# Patient Record
Sex: MALE | Race: WHITE | Employment: FULL TIME | URBAN - NONMETROPOLITAN AREA
[De-identification: names, ages, dates, MRNs, and addresses within clinical notes are randomized per-mention and may not be internally consistent; named-entity substitution may affect disease eponyms.]

---

## 2020-06-19 PROBLEM — I21.3 STEMI (ST ELEVATION MYOCARDIAL INFARCTION) (HCC): Status: ACTIVE | Noted: 2020-06-19

## 2020-06-19 PROCEDURE — 99223 1ST HOSP IP/OBS HIGH 75: CPT | Performed by: INTERNAL MEDICINE

## 2020-06-19 PROCEDURE — C1769 GUIDE WIRE: HCPCS

## 2020-06-19 PROCEDURE — 2709999900 HC NON-CHARGEABLE SUPPLY

## 2020-06-19 PROCEDURE — C1894 INTRO/SHEATH, NON-LASER: HCPCS

## 2020-06-19 PROCEDURE — C1887 CATHETER, GUIDING: HCPCS

## 2020-06-20 ENCOUNTER — HOSPITAL ENCOUNTER (INPATIENT)
Age: 55
LOS: 1 days | Discharge: HOME | DRG: 247 | End: 2020-06-21
Attending: INTERNAL MEDICINE | Admitting: INTERNAL MEDICINE

## 2020-06-20 LAB
ACTIVATED CLOTTING TIME: 246 SECONDS (ref 1–150)
ALBUMIN SERPL-MCNC: 3.9 G/DL (ref 3.5–5.1)
ALP BLD-CCNC: 65 U/L (ref 38–126)
ALT SERPL-CCNC: 53 U/L (ref 11–66)
ANION GAP SERPL CALCULATED.3IONS-SCNC: 10 MEQ/L (ref 8–16)
AST SERPL-CCNC: 217 U/L (ref 5–40)
BILIRUB SERPL-MCNC: 0.7 MG/DL (ref 0.3–1.2)
BILIRUBIN DIRECT: < 0.2 MG/DL (ref 0–0.3)
BUN BLDV-MCNC: 11 MG/DL (ref 7–22)
CALCIUM SERPL-MCNC: 9.1 MG/DL (ref 8.5–10.5)
CHLORIDE BLD-SCNC: 101 MEQ/L (ref 98–111)
CHOLESTEROL, TOTAL: 167 MG/DL (ref 100–199)
CO2: 21 MEQ/L (ref 23–33)
CREAT SERPL-MCNC: 0.7 MG/DL (ref 0.4–1.2)
EKG ATRIAL RATE: 62 BPM
EKG ATRIAL RATE: 65 BPM
EKG P AXIS: 44 DEGREES
EKG P AXIS: 51 DEGREES
EKG P-R INTERVAL: 164 MS
EKG P-R INTERVAL: 168 MS
EKG Q-T INTERVAL: 396 MS
EKG Q-T INTERVAL: 464 MS
EKG QRS DURATION: 84 MS
EKG QRS DURATION: 96 MS
EKG QTC CALCULATION (BAZETT): 401 MS
EKG QTC CALCULATION (BAZETT): 482 MS
EKG R AXIS: 32 DEGREES
EKG R AXIS: 59 DEGREES
EKG T AXIS: -121 DEGREES
EKG T AXIS: -34 DEGREES
EKG VENTRICULAR RATE: 62 BPM
EKG VENTRICULAR RATE: 65 BPM
GFR SERPL CREATININE-BSD FRML MDRD: > 90 ML/MIN/1.73M2
GLUCOSE BLD-MCNC: 186 MG/DL (ref 70–108)
HDLC SERPL-MCNC: 44 MG/DL
LDL CHOLESTEROL CALCULATED: 77 MG/DL
LV EF: 38 %
LVEF MODALITY: NORMAL
POTASSIUM REFLEX MAGNESIUM: 4 MEQ/L (ref 3.5–5.2)
REASON FOR REJECTION: NORMAL
REJECTED TEST: NORMAL
SODIUM BLD-SCNC: 132 MEQ/L (ref 135–145)
TOTAL PROTEIN: 6.3 G/DL (ref 6.1–8)
TRIGL SERPL-MCNC: 230 MG/DL (ref 0–199)
VANCOMYCIN RESISTANT ENTEROCOCCUS: NEGATIVE

## 2020-06-20 PROCEDURE — 85347 COAGULATION TIME ACTIVATED: CPT

## 2020-06-20 PROCEDURE — 2580000003 HC RX 258: Performed by: NURSE PRACTITIONER

## 2020-06-20 PROCEDURE — 6370000000 HC RX 637 (ALT 250 FOR IP)

## 2020-06-20 PROCEDURE — 93306 TTE W/DOPPLER COMPLETE: CPT

## 2020-06-20 PROCEDURE — 92941 PRQ TRLML REVSC TOT OCCL AMI: CPT | Performed by: INTERNAL MEDICINE

## 2020-06-20 PROCEDURE — 93458 L HRT ARTERY/VENTRICLE ANGIO: CPT | Performed by: INTERNAL MEDICINE

## 2020-06-20 PROCEDURE — 2500000003 HC RX 250 WO HCPCS

## 2020-06-20 PROCEDURE — 2709999900 HC NON-CHARGEABLE SUPPLY

## 2020-06-20 PROCEDURE — 6360000004 HC RX CONTRAST MEDICATION: Performed by: INTERNAL MEDICINE

## 2020-06-20 PROCEDURE — 6370000000 HC RX 637 (ALT 250 FOR IP): Performed by: INTERNAL MEDICINE

## 2020-06-20 PROCEDURE — 36415 COLL VENOUS BLD VENIPUNCTURE: CPT

## 2020-06-20 PROCEDURE — 80061 LIPID PANEL: CPT

## 2020-06-20 PROCEDURE — 6370000000 HC RX 637 (ALT 250 FOR IP): Performed by: NURSE PRACTITIONER

## 2020-06-20 PROCEDURE — C1894 INTRO/SHEATH, NON-LASER: HCPCS

## 2020-06-20 PROCEDURE — 93005 ELECTROCARDIOGRAM TRACING: CPT | Performed by: INTERNAL MEDICINE

## 2020-06-20 PROCEDURE — C1769 GUIDE WIRE: HCPCS

## 2020-06-20 PROCEDURE — 6360000002 HC RX W HCPCS

## 2020-06-20 PROCEDURE — 80048 BASIC METABOLIC PNL TOTAL CA: CPT

## 2020-06-20 PROCEDURE — C1887 CATHETER, GUIDING: HCPCS

## 2020-06-20 PROCEDURE — 87081 CULTURE SCREEN ONLY: CPT

## 2020-06-20 PROCEDURE — 4A023N7 MEASUREMENT OF CARDIAC SAMPLING AND PRESSURE, LEFT HEART, PERCUTANEOUS APPROACH: ICD-10-PCS | Performed by: INTERNAL MEDICINE

## 2020-06-20 PROCEDURE — 2140000000 HC CCU INTERMEDIATE R&B

## 2020-06-20 PROCEDURE — C1725 CATH, TRANSLUMIN NON-LASER: HCPCS

## 2020-06-20 PROCEDURE — 027034Z DILATION OF CORONARY ARTERY, ONE ARTERY WITH DRUG-ELUTING INTRALUMINAL DEVICE, PERCUTANEOUS APPROACH: ICD-10-PCS | Performed by: INTERNAL MEDICINE

## 2020-06-20 PROCEDURE — 99232 SBSQ HOSP IP/OBS MODERATE 35: CPT | Performed by: NURSE PRACTITIONER

## 2020-06-20 PROCEDURE — B2111ZZ FLUOROSCOPY OF MULTIPLE CORONARY ARTERIES USING LOW OSMOLAR CONTRAST: ICD-10-PCS | Performed by: INTERNAL MEDICINE

## 2020-06-20 PROCEDURE — 93005 ELECTROCARDIOGRAM TRACING: CPT | Performed by: NURSE PRACTITIONER

## 2020-06-20 PROCEDURE — 94761 N-INVAS EAR/PLS OXIMETRY MLT: CPT

## 2020-06-20 PROCEDURE — B2151ZZ FLUOROSCOPY OF LEFT HEART USING LOW OSMOLAR CONTRAST: ICD-10-PCS | Performed by: INTERNAL MEDICINE

## 2020-06-20 PROCEDURE — 80076 HEPATIC FUNCTION PANEL: CPT

## 2020-06-20 PROCEDURE — 87500 VANOMYCIN DNA AMP PROBE: CPT

## 2020-06-20 PROCEDURE — C1874 STENT, COATED/COV W/DEL SYS: HCPCS

## 2020-06-20 PROCEDURE — 6360000002 HC RX W HCPCS: Performed by: INTERNAL MEDICINE

## 2020-06-20 RX ORDER — ATORVASTATIN CALCIUM 80 MG/1
80 TABLET, FILM COATED ORAL NIGHTLY
Status: DISCONTINUED | OUTPATIENT
Start: 2020-06-20 | End: 2020-06-21 | Stop reason: HOSPADM

## 2020-06-20 RX ORDER — LISINOPRIL 5 MG/1
5 TABLET ORAL DAILY
Status: DISCONTINUED | OUTPATIENT
Start: 2020-06-20 | End: 2020-06-21 | Stop reason: HOSPADM

## 2020-06-20 RX ORDER — SODIUM CHLORIDE 0.9 % (FLUSH) 0.9 %
10 SYRINGE (ML) INJECTION PRN
Status: DISCONTINUED | OUTPATIENT
Start: 2020-06-20 | End: 2020-06-21 | Stop reason: HOSPADM

## 2020-06-20 RX ORDER — ASPIRIN 81 MG/1
81 TABLET, CHEWABLE ORAL DAILY
Status: DISCONTINUED | OUTPATIENT
Start: 2020-06-21 | End: 2020-06-21 | Stop reason: HOSPADM

## 2020-06-20 RX ORDER — ACETAMINOPHEN 325 MG/1
650 TABLET ORAL EVERY 4 HOURS PRN
Status: DISCONTINUED | OUTPATIENT
Start: 2020-06-20 | End: 2020-06-21 | Stop reason: HOSPADM

## 2020-06-20 RX ORDER — SODIUM CHLORIDE 0.9 % (FLUSH) 0.9 %
10 SYRINGE (ML) INJECTION EVERY 12 HOURS SCHEDULED
Status: DISCONTINUED | OUTPATIENT
Start: 2020-06-20 | End: 2020-06-21 | Stop reason: HOSPADM

## 2020-06-20 RX ORDER — ONDANSETRON 2 MG/ML
4 INJECTION INTRAMUSCULAR; INTRAVENOUS EVERY 6 HOURS PRN
Status: DISCONTINUED | OUTPATIENT
Start: 2020-06-20 | End: 2020-06-21 | Stop reason: HOSPADM

## 2020-06-20 RX ORDER — NITROGLYCERIN 20 MG/100ML
20 INJECTION INTRAVENOUS CONTINUOUS
Status: DISCONTINUED | OUTPATIENT
Start: 2020-06-20 | End: 2020-06-20

## 2020-06-20 RX ADMIN — TICAGRELOR 90 MG: 90 TABLET ORAL at 10:25

## 2020-06-20 RX ADMIN — METOPROLOL TARTRATE 25 MG: 25 TABLET ORAL at 08:35

## 2020-06-20 RX ADMIN — ATORVASTATIN CALCIUM 80 MG: 80 TABLET, FILM COATED ORAL at 19:29

## 2020-06-20 RX ADMIN — Medication 10 ML: at 19:30

## 2020-06-20 RX ADMIN — ATORVASTATIN CALCIUM 80 MG: 80 TABLET, FILM COATED ORAL at 01:31

## 2020-06-20 RX ADMIN — METOPROLOL TARTRATE 25 MG: 25 TABLET ORAL at 19:29

## 2020-06-20 RX ADMIN — LISINOPRIL 5 MG: 5 TABLET ORAL at 08:35

## 2020-06-20 RX ADMIN — ACETAMINOPHEN 650 MG: 325 TABLET ORAL at 02:35

## 2020-06-20 RX ADMIN — ONDANSETRON 4 MG: 2 INJECTION INTRAMUSCULAR; INTRAVENOUS at 06:51

## 2020-06-20 RX ADMIN — TICAGRELOR 90 MG: 90 TABLET ORAL at 19:29

## 2020-06-20 RX ADMIN — IOPAMIDOL 150 ML: 755 INJECTION, SOLUTION INTRAVENOUS at 00:52

## 2020-06-20 RX ADMIN — METOPROLOL TARTRATE 25 MG: 25 TABLET ORAL at 01:32

## 2020-06-20 ASSESSMENT — PAIN SCALES - GENERAL
PAINLEVEL_OUTOF10: 5
PAINLEVEL_OUTOF10: 5
PAINLEVEL_OUTOF10: 0
PAINLEVEL_OUTOF10: 1

## 2020-06-20 ASSESSMENT — PAIN DESCRIPTION - DESCRIPTORS
DESCRIPTORS: SORE
DESCRIPTORS: HEADACHE

## 2020-06-20 ASSESSMENT — PAIN DESCRIPTION - ORIENTATION: ORIENTATION: LEFT;MID

## 2020-06-20 ASSESSMENT — PAIN DESCRIPTION - LOCATION
LOCATION: CHEST
LOCATION: HEAD

## 2020-06-20 ASSESSMENT — PAIN DESCRIPTION - PROGRESSION: CLINICAL_PROGRESSION: GRADUALLY IMPROVING

## 2020-06-20 ASSESSMENT — PAIN DESCRIPTION - FREQUENCY
FREQUENCY: INTERMITTENT
FREQUENCY: INTERMITTENT

## 2020-06-20 ASSESSMENT — PAIN DESCRIPTION - PAIN TYPE: TYPE: ACUTE PAIN

## 2020-06-20 NOTE — PROGRESS NOTES
5339 - Patient arrived to unit from Cath lab via bed. Patient transferred to ICU bed and placed on continuous ICU bedside monitor. Patient admitted for STEMI (ST elevation myocardial infarction) ([facility] [address] [I21.3]. Vitals obtained. See flowsheets. Patient's IV access includes 18G in Right AC. Current infusions and rates of infusion include normal saline @ 100 ml/hr and Nitro @ 10 mcg/min. [facility]. Two nurse skin assessment completed by Kristen Ceja. See flowsheets for assessment details. Policies and procedures of ICU able to be explained to patient at this time. Family member(s)/representative(s) present at time of admission include N/A. Patient rights explained to family member(s)/representatives and patient, as able. Patient/patient's family member(s)/representative(s) Declined to have physician notified of their admission. All questions posed by patient's family member(s)/representative(s) and patient answered at this time.

## 2020-06-20 NOTE — H&P
General appearance - alert, in mild distress due to chest pain  Mental status - alert, oriented to person, place, and time  Neck - supple, no significant adenopathy, no JVD, or carotid bruits  Chest - clear to auscultation, no wheezes, rales or rhonchi, symmetric air entry  Heart - normal rate, regular rhythm, normal S1, S2, no murmurs, rubs, clicks or gallops  Abdomen - soft, nontender, nondistended, no masses or organomegaly  Neurological - alert, oriented, normal speech, no focal findings or movement disorder noted  Musculoskeletal - no joint tenderness, deformity or swelling  Extremities - peripheral pulses normal, no pedal edema, no clubbing or cyanosis  Skin - normal coloration and turgor, no rashes, no suspicious skin lesions noted      LABS:    No results for input(s): CKTOTAL, CKMB, CKMBINDEX, TROPONINT in the last 72 hours. CBC: No results found for: WBC, RBC, HGB, HCT, MCV, MCH, MCHC, RDW, PLT, MPV  BMP:  No results found for: NA, K, CL, CO2, BUN, LABALBU, CREATININE, CALCIUM, GFRAA, LABGLOM, GLUCOSE  Hepatic Function Panel:  No results found for: ALKPHOS, ALT, AST, PROT, BILITOT, BILIDIR, IBILI, LABALBU  Magnesium:  No results found for: MG  Warfarin PT/INR:  No components found for: PTPATWAR, PTINRWAR  HgBA1c:  No results found for: LABA1C  FLP:  No results found for: TRIG, HDL, LDLCALC, LDLDIRECT, LABVLDL  TSH:  No results found for: TSH  BNP: No components found for: PRO-BNP      Assessment/Plan:    Patient Active Problem List   Diagnosis    STEMI (ST elevation myocardial infarction) ([facility] [address]     Anterior ST segment elevation MI  HTN  Smoker    IV heparin  Aspirin, P2Y12 inhibitor  Statin, Beta blocker   Needs emergent C  Transfer to ICU/CCU after cath  2D Echo for LVSF/WMA  The indication, risks and benefits of the procedure and possible therapeutic consequences and alternatives were discussed with the patient.    The patient was given the opportunity to ask questions and to have them answered to his/her satisfaction. Risks of the procedure include but are not limited to the following: Bleeding, hematoma including retroperitoneal hematoma, infection, pain and discomfort, injury to the aorta and other blood vessels, rhythm disturbance, low blood pressure, myocardial infarction, stroke, kidney damage/failure, myocardial perforation, allergic reactions to sedatives/contrast material, loss of pulse/vascular compromise requiring surgery, aneurysm/pseudoaneurysm formation, possible loss of a limb/hand/leg, death. Alternatives to and omission of the suggested procedure were discussed. The patient had no further questions and wished to proceed; the consent form was signed. Please do note hesitate to contact me for any further questions.      Code Status: No Order    Electronically signed by Jayme Benjamin MD on 6/19/2020 at 11:00 PM

## 2020-06-20 NOTE — OP NOTE
[facility]  Sedation/Analgesia Post Sedation Record        Pt Name: Brandon Simms  MRN: [id number]  YOB: 1965  Procedure Performed By: Michael Monroe MD, Thu Martinez,   Primary Care Physician: No primary care provider on file.         POST-PROCEDURE    Physicians/Assistants: Michael Monroe MD, Thu Martinez,     Procedure Performed:  Left heart cath and PCI to mid LAD                                  Sedation/Anesthesia:  Local Anesthesia and IV Conscious Sedation with continuous O2 monitoring    Estimated Blood Loss: 10 cc     Specimens Removed:  [x]None []Other:      Disposition of Specimen:  []Pathology []Other        Complications:   [x]None Immediate []Other:       Post Procedure Diagnosis/Findings:    Coronary Artery Disease   STEMI, Anterior         Recommendations:    DAPT   Statin  2D Echo  BB  ICU/CCU Care  Post STEMI/PCI care  IV fluids            Michael Monroe MD, Thu Martinez, [facility]  Electronically signed 6/20/2020 at 12:42 AM

## 2020-06-20 NOTE — PROGRESS NOTES
Cardiology Progress Note      Patient:  Svetlana Owen  YOB: 1965  MRN: [id number]   Acct: [de-identified]  Admit Date:  6/20/2020  Primary Cardiologist: none  Seen by Dr. Zenia Del Rosario    Per prior cardiology consult note-  Reason for Admission:  Chest pain/STEMI        History Of Present Illness:    60 y.o.  male c hx of HTN, diet controlled DM, Smoker who presented to the [address] [place] [address] ER with chest pain. The chest pain started in the morning at 9 am, midsternal, nonradiating, associated with shortness of breath and diaphoresis. The pain was intermittent. EMS initially showed up to his placed but patient did not want to come to the ED. Later in the evening he decided to come to the ED and EKG at [address] [place] [address] showed SR with ST elevation from V1-V6. Has ongoing chest pain.   He is life flighted here    Subjective (Events in last 24 hours):     Pt in bed   States he is feeling much improved from yesterday     Still with slight chest ache as he described it but not the burning that he had yesterday - no other associated symptoms     VSS  Tele SR no ectopy      RT radial cath site - no ecchymosis or hematoma - Pulses present - neurovascular check WNL     Objective:   BP (!) 146/94   Pulse 78   Temp 97.7 °F (36.5 °C) (Oral)   Resp 15   Ht 5' 7\" (1.702 m)   Wt 228 lb 9.9 oz (103.7 kg)   SpO2 97%   BMI 35.81 kg/m²        TELEMETRY: SR    Physical Exam:  General Appearance: alert and oriented to person, place and time, in no acute distress  Cardiovascular: normal rate, regular rhythm, normal S1 and S2, no murmurs, rubs, clicks, or gallops, distal pulses intact,   Pulmonary/Chest: clear to auscultation bilaterally- no wheezes, rales or rhonchi, normal air movement, no respiratory distress  Abdomen: soft, non-tender, non-distended, normal bowel sounds, no masses Extremities: no cyanosis, clubbing or edema, pulses present   Skin: warm and dry, no rash or erythema  Head: normocephalic and atraumatic  Musculoskeletal: normal range of motion, no joint swelling, deformity or tenderness  Neurological: alert, oriented, normal speech, no focal findings or movement disorder noted    Medications:    sodium chloride flush  10 mL Intravenous 2 times per day    metoprolol tartrate  25 mg Oral BID    lisinopril  5 mg Oral Daily    atorvastatin  80 mg Oral Nightly    [START ON 2020] aspirin  81 mg Oral Daily    ticagrelor  90 mg Oral BID      nitroGLYCERIN 25 mcg/min (20 0300)     sodium chloride flush, 10 mL, PRN  acetaminophen, 650 mg, Q4H PRN  magnesium hydroxide, 30 mL, Daily PRN  ondansetron, 4 mg, Q6H PRN        Diagnostics:  EK2020 09:36:57 [facility]-ICU ROUTINE RETRIEVAL  Normal sinus rhythm  ST elevation consider anterolateral injury or acute infarct  Prolonged QT interval or tu fusion, consider myocardial disease, electrolyte imbalance, or drug effects  **  ACUTE MI / STEMI **  Abnormal ECG  When compared with ECG of 2020 03:09,  ST elevation now present in Anterior leads  T wave inversion now evident in Anterolateral leads  QT has lengthened    Echo: pending      Left Heart Cath:   CORONARY ANATOMY:  RIGHT CORONARY ARTERY:  Right coronary artery appears to be the dominant  system. There are mild luminal irregularities in the proximal portion. Mid portion, there is at least 50% stenosis followed by mild luminal  irregularities of the distal portion of the RCA.     LEFT MAIN:  Patent.   Gives rise to LAD and left circumflex arteries.     LEFT CIRCUMFLEX ARTERY:  It is patent in the proximal, mid and distal  portions with mild luminal irregularities distally.     LAD:  Patent and ectatic in the proximal portion, followed by thrombotic  99% stenosis in the mid portion of the LAD, followed by very sluggish  flow in mid to distal portion of the LAD.     There is a large-caliber diagonal branch that comes off just prior to  this thrombotic lesion.     LVEDP was measured to be 22 mmHg. There is no obvious gradient across  the aortic valve. LV systolic function was roughly estimated to be 35%  to 40% with apical anterior wall, anterior apical hypokinesia.       SUMMARY:  1. Successful PCI of thrombotic mid LAD stenosis with Xience Angelika  drug-eluting stent. 2.  LVEDP was measured to be 22 mmHg. 3.  LV systolic function was estimated at 35% to 40% with anterior,  anterior apical wall hypokinesia.     RECOMMENDATIONS:  1. DAPT for at least one year. 2.  Lipid-lowering therapy. 3.  Aggressive risk factor modification. 4.  Transfer to ICU for close monitoring. 5.  2D echocardiogram.  6.  Get cardiac rehab consult.     All procedure details and management plans were discussed in detail with  the patient and family members and they were in agreement with the plan.           Kaylan Wyman MD     D: 06/20/2020         Lab Data:    Cardiac Enzymes:  No results for input(s): CKTOTAL, CKMB, CKMBINDEX, TROPONINI in the last 72 hours. CBC:   No results found for: WBC, RBC, HGB, HCT, PLT    CMP:  No results found for: NA, K, CL, CO2, BUN, CREATININE, GFRAA, AGRATIO, LABGLOM, GLUCOSE, CALCIUM    Hepatic Function Panel:  No results found for: ALKPHOS, ALT, AST, PROT, BILITOT, BILIDIR, IBILI, LABALBU    Magnesium:  No results found for: MG    PT/INR:  No results found for: PROTIME, INR    HgBA1c:  No results found for: LABA1C    FLP:  No results found for: TRIG, HDL, LDLCALC, LDLDIRECT, LABVLDL    TSH:  No results found for: TSH      Assessment:    STEMI - anterior    S\p cardiac cath 6/20/2020: Successful PCI of thrombotic mid LAD stenosis with Xience Angelika drug-eluting stent.     ICDMP EF 35-40% by cath     HTN    Tobacco abuse- 1 pack/ day --- cessation discussed 3 minutes     Hx DM II- off meds         Plan:  · Wean off iv nitro   · Stepdown 3B  · Ambulate   · ekg am   · hgb a1c check   · Follow - possible home tomorrow         Electronically signed by Edu Godwin

## 2020-06-21 VITALS
RESPIRATION RATE: 18 BRPM | HEART RATE: 76 BPM | SYSTOLIC BLOOD PRESSURE: 142 MMHG | OXYGEN SATURATION: 94 % | WEIGHT: 228.62 LBS | DIASTOLIC BLOOD PRESSURE: 95 MMHG | HEIGHT: 67 IN | BODY MASS INDEX: 35.88 KG/M2 | TEMPERATURE: 98.2 F

## 2020-06-21 LAB
ANION GAP SERPL CALCULATED.3IONS-SCNC: 9 MEQ/L (ref 8–16)
BUN BLDV-MCNC: 11 MG/DL (ref 7–22)
CALCIUM SERPL-MCNC: 9.1 MG/DL (ref 8.5–10.5)
CHLORIDE BLD-SCNC: 107 MEQ/L (ref 98–111)
CO2: 23 MEQ/L (ref 23–33)
CREAT SERPL-MCNC: 0.8 MG/DL (ref 0.4–1.2)
EKG ATRIAL RATE: 70 BPM
EKG P AXIS: 49 DEGREES
EKG P-R INTERVAL: 150 MS
EKG Q-T INTERVAL: 464 MS
EKG QRS DURATION: 96 MS
EKG QTC CALCULATION (BAZETT): 501 MS
EKG R AXIS: 59 DEGREES
EKG T AXIS: 178 DEGREES
EKG VENTRICULAR RATE: 70 BPM
ERYTHROCYTE [DISTWIDTH] IN BLOOD BY AUTOMATED COUNT: 14 % (ref 11.5–14.5)
ERYTHROCYTE [DISTWIDTH] IN BLOOD BY AUTOMATED COUNT: 49.8 FL (ref 35–45)
GFR SERPL CREATININE-BSD FRML MDRD: > 90 ML/MIN/1.73M2
GLUCOSE BLD-MCNC: 141 MG/DL (ref 70–108)
HCT VFR BLD CALC: 46.7 % (ref 42–52)
HEMOGLOBIN: 14.5 GM/DL (ref 14–18)
MCH RBC QN AUTO: 29.9 PG (ref 26–33)
MCHC RBC AUTO-ENTMCNC: 31 GM/DL (ref 32.2–35.5)
MCV RBC AUTO: 96.3 FL (ref 80–94)
PLATELET # BLD: 158 THOU/MM3 (ref 130–400)
PMV BLD AUTO: 10.8 FL (ref 9.4–12.4)
POTASSIUM SERPL-SCNC: 4.4 MEQ/L (ref 3.5–5.2)
RBC # BLD: 4.85 MILL/MM3 (ref 4.7–6.1)
SODIUM BLD-SCNC: 139 MEQ/L (ref 135–145)
WBC # BLD: 9.8 THOU/MM3 (ref 4.8–10.8)

## 2020-06-21 PROCEDURE — 93005 ELECTROCARDIOGRAM TRACING: CPT | Performed by: NURSE PRACTITIONER

## 2020-06-21 PROCEDURE — 2580000003 HC RX 258: Performed by: NURSE PRACTITIONER

## 2020-06-21 PROCEDURE — 36415 COLL VENOUS BLD VENIPUNCTURE: CPT

## 2020-06-21 PROCEDURE — 6370000000 HC RX 637 (ALT 250 FOR IP): Performed by: NURSE PRACTITIONER

## 2020-06-21 PROCEDURE — 80048 BASIC METABOLIC PNL TOTAL CA: CPT

## 2020-06-21 PROCEDURE — 99238 HOSP IP/OBS DSCHRG MGMT 30/<: CPT | Performed by: NURSE PRACTITIONER

## 2020-06-21 PROCEDURE — 85027 COMPLETE CBC AUTOMATED: CPT

## 2020-06-21 RX ORDER — LISINOPRIL 5 MG/1
5 TABLET ORAL DAILY
Qty: 30 TABLET | Refills: 3 | Status: SHIPPED | OUTPATIENT
Start: 2020-06-22 | End: 2020-10-19 | Stop reason: SDUPTHER

## 2020-06-21 RX ORDER — ATORVASTATIN CALCIUM 80 MG/1
80 TABLET, FILM COATED ORAL NIGHTLY
Qty: 30 TABLET | Refills: 3 | Status: SHIPPED | OUTPATIENT
Start: 2020-06-21 | End: 2021-08-19 | Stop reason: SDUPTHER

## 2020-06-21 RX ORDER — NITROGLYCERIN 0.4 MG/1
0.4 TABLET SUBLINGUAL EVERY 5 MIN PRN
Qty: 25 TABLET | Refills: 1 | Status: SHIPPED | OUTPATIENT
Start: 2020-06-21 | End: 2020-11-02 | Stop reason: SDUPTHER

## 2020-06-21 RX ORDER — ASPIRIN 81 MG/1
81 TABLET, CHEWABLE ORAL DAILY
Qty: 30 TABLET | Refills: 3 | Status: SHIPPED | OUTPATIENT
Start: 2020-06-22

## 2020-06-21 RX ADMIN — LISINOPRIL 5 MG: 5 TABLET ORAL at 09:42

## 2020-06-21 RX ADMIN — ASPIRIN 81 MG 81 MG: 81 TABLET ORAL at 09:42

## 2020-06-21 RX ADMIN — TICAGRELOR 90 MG: 90 TABLET ORAL at 09:42

## 2020-06-21 RX ADMIN — METOPROLOL TARTRATE 25 MG: 25 TABLET ORAL at 09:41

## 2020-06-21 RX ADMIN — Medication 10 ML: at 09:42

## 2020-06-21 ASSESSMENT — PAIN SCALES - GENERAL: PAINLEVEL_OUTOF10: 0

## 2020-06-21 NOTE — PROCEDURES
EKG was handed to Lila López.
Ekg was given to [place]
portion of the LAD, followed by very sluggish  flow in mid to distal portion of the LAD. There is a large-caliber diagonal branch that comes off just prior to  this thrombotic lesion. LVEDP was measured to be 22 mmHg. There is no obvious gradient across  the aortic valve. LV systolic function was roughly estimated to be 35%  to 40% with apical anterior wall, anterior apical hypokinesia. IV heparin was given and ACT was maintained above 250 seconds. EBU 3.5  guide catheter was used to engage the left main. A Runthrough wire was  used to cross the critical stenosis in the LAD and the tip of the wire  was placed in the distal LAD. We first predilated the lesion with 2.5 x  12 mm Trek balloon. After this, we placed a 3.25 x 28 mm Xience Angelika  drug-eluting stent from normal-to-normal segment fashion. This stent  was post dilated at high pressures to up to 4.0 mm in diameter to ensure  good vessel wall apposition. After this, repeated angiogram showed good  LASHONDA-3 flow throughout the entire main vessel as well as the side  branches. There were no dissections, perforations, distal  embolizations, or side branch closures that were noted. The patient's chest pain at this point resolved, although there were  still EKG changes. He tolerated the procedure well. He was given 180 mg of loading dose of  Brilinta on the table. SUMMARY:  1. Successful PCI of thrombotic mid LAD stenosis with Xience Angelika  drug-eluting stent. 2.  LVEDP was measured to be 22 mmHg. 3.  LV systolic function was estimated at 35% to 40% with anterior,  anterior apical wall hypokinesia. RECOMMENDATIONS:  1. DAPT for at least one year. 2.  Lipid-lowering therapy. 3.  Aggressive risk factor modification. 4.  Transfer to ICU for close monitoring. 5.  2D echocardiogram.  6.  Get cardiac rehab consult.     All procedure details and management plans were discussed in detail with  the patient and family members and they

## 2020-06-21 NOTE — PROGRESS NOTES
Extremities: no cyanosis, clubbing or edema, pulses present   Skin: warm and dry, no rash or erythema  Head: normocephalic and atraumatic  Musculoskeletal: normal range of motion, no joint swelling, deformity or tenderness  Neurological: alert, oriented, normal speech, no focal findings or movement disorder noted    Medications:    sodium chloride flush  10 mL Intravenous 2 times per day    metoprolol tartrate  25 mg Oral BID    lisinopril  5 mg Oral Daily    atorvastatin  80 mg Oral Nightly    aspirin  81 mg Oral Daily    ticagrelor  90 mg Oral BID       sodium chloride flush, 10 mL, PRN  acetaminophen, 650 mg, Q4H PRN  magnesium hydroxide, 30 mL, Daily PRN  ondansetron, 4 mg, Q6H PRN        Diagnostics:  EK2020 09:36:57 [facility]-ICU ROUTINE RETRIEVAL  Normal sinus rhythm  ST elevation consider anterolateral injury or acute infarct  Prolonged QT interval or tu fusion, consider myocardial disease, electrolyte imbalance, or drug effects  **  ACUTE MI / STEMI **  Abnormal ECG  When compared with ECG of 2020 03:09,  ST elevation now present in Anterior leads  T wave inversion now evident in Anterolateral leads  QT has lengthened    Echo: pending      Left Heart Cath:   CORONARY ANATOMY:  RIGHT CORONARY ARTERY:  Right coronary artery appears to be the dominant  system. There are mild luminal irregularities in the proximal portion. Mid portion, there is at least 50% stenosis followed by mild luminal  irregularities of the distal portion of the RCA.     LEFT MAIN:  Patent.   Gives rise to LAD and left circumflex arteries.     LEFT CIRCUMFLEX ARTERY:  It is patent in the proximal, mid and distal  portions with mild luminal irregularities distally.     LAD:  Patent and ectatic in the proximal portion, followed by thrombotic  99% stenosis in the mid portion of the LAD, followed by very sluggish  flow in mid to distal portion of the LAD.     There is a large-caliber diagonal branch that comes off just prior to  this thrombotic lesion.     LVEDP was measured to be 22 mmHg. There is no obvious gradient across  the aortic valve. LV systolic function was roughly estimated to be 35%  to 40% with apical anterior wall, anterior apical hypokinesia.       SUMMARY:  1. Successful PCI of thrombotic mid LAD stenosis with Xience Angelika  drug-eluting stent. 2.  LVEDP was measured to be 22 mmHg. 3.  LV systolic function was estimated at 35% to 40% with anterior,  anterior apical wall hypokinesia.     RECOMMENDATIONS:  1. DAPT for at least one year. 2.  Lipid-lowering therapy. 3.  Aggressive risk factor modification. 4.  Transfer to ICU for close monitoring. 5.  2D echocardiogram.  6.  Get cardiac rehab consult.     All procedure details and management plans were discussed in detail with  the patient and family members and they were in agreement with the plan.           Jolynn Tellez MD     D: 06/20/2020         Lab Data:    Cardiac Enzymes:  No results for input(s): CKTOTAL, CKMB, CKMBINDEX, TROPONINI in the last 72 hours.     CBC:   Lab Results   Component Value Date    WBC 9.8 06/21/2020    RBC 4.85 06/21/2020    HGB 14.5 06/21/2020    HCT 46.7 06/21/2020     06/21/2020       CMP:    Lab Results   Component Value Date     06/21/2020    K 4.4 06/21/2020    K 4.0 06/20/2020     06/21/2020    CO2 23 06/21/2020    BUN 11 06/21/2020    CREATININE 0.8 06/21/2020    LABGLOM >90 06/21/2020    GLUCOSE 141 06/21/2020    CALCIUM 9.1 06/21/2020       Hepatic Function Panel:    Lab Results   Component Value Date    ALKPHOS 65 06/20/2020    ALT 53 06/20/2020     06/20/2020    PROT 6.3 06/20/2020    BILITOT 0.7 06/20/2020    BILIDIR <0.2 06/20/2020    LABALBU 3.9 06/20/2020       Magnesium:  No results found for: MG    PT/INR:  No results found for: PROTIME, INR    HgBA1c:  No results found for: LABA1C    FLP:    Lab Results   Component Value Date    TRIG 230 06/20/2020    HDL 44 06/20/2020

## 2020-06-22 ENCOUNTER — TELEPHONE (OUTPATIENT)
Dept: CARDIOLOGY CLINIC | Age: 55
End: 2020-06-22

## 2020-06-22 LAB — MRSA SCREEN: NORMAL

## 2020-06-22 NOTE — TELEPHONE ENCOUNTER
[place] for pt to return call. appt scheduled 6/29/20 at 1215PM with Dr. Kristan Castillo.   Does this date/time work for pt?

## 2020-06-22 NOTE — PROGRESS NOTES
Inpatient Cardiac Rehabilitation Consult    Received consult for Phase II Cardiac Rehabilitation. Missed pt over the weekend. Will call him at home to complete cardiac rehab education and schedule.

## 2020-07-23 ENCOUNTER — OFFICE VISIT (OUTPATIENT)
Dept: CARDIOLOGY CLINIC | Age: 55
End: 2020-07-23

## 2020-07-23 VITALS
HEART RATE: 84 BPM | DIASTOLIC BLOOD PRESSURE: 84 MMHG | SYSTOLIC BLOOD PRESSURE: 140 MMHG | WEIGHT: 229 LBS | BODY MASS INDEX: 36.8 KG/M2 | HEIGHT: 66 IN

## 2020-07-23 PROCEDURE — 99213 OFFICE O/P EST LOW 20 MIN: CPT | Performed by: NURSE PRACTITIONER

## 2020-07-23 RX ORDER — CLOPIDOGREL BISULFATE 75 MG/1
75 TABLET ORAL DAILY
Qty: 90 TABLET | Refills: 3 | Status: SHIPPED | OUTPATIENT
Start: 2020-07-23 | End: 2021-07-28 | Stop reason: SDUPTHER

## 2020-07-23 RX ORDER — ISOSORBIDE MONONITRATE 30 MG/1
30 TABLET, EXTENDED RELEASE ORAL DAILY
Qty: 90 TABLET | Refills: 3 | Status: SHIPPED | OUTPATIENT
Start: 2020-07-23 | End: 2021-08-02 | Stop reason: SDUPTHER

## 2020-07-23 RX ORDER — CLOPIDOGREL 300 MG/1
600 TABLET, FILM COATED ORAL ONCE
Qty: 2 TABLET | Refills: 0 | Status: SHIPPED | OUTPATIENT
Start: 2020-07-23 | End: 2021-02-08

## 2020-07-23 NOTE — PROGRESS NOTES
[facility] PROFESSIONAL SERVICES  [facility]   [address]   [address] [address]   Dept: [phone]   Dept Fax: [phone]   Loc: [phone]      Chief Complaint   Patient presents with    Follow-up     F/U STEMI with LHC/PCI/TANMAY of thrombotic mid LAD, ICMP EF 35-40 in 59 y/o male with history of currently smoking 1/2 pack cigarettes along with 4-5 pouches smokeless tobacco. Denies chest pain, palpitations, sob, PHIL, lightheadedness, dizziness or syncope. No bleeding issues with DAPT. Left radial cath site soft without pain, bleeding, drainage, redness, or warmth. Right upper extremity with normal color / temperature, evident movement / sensation, and pulses present. Cardiologist:  Dr. Weaver Postal:   No fever, no chills, No fatigue or weight loss  Pulmonary:    No dyspnea, no wheezing  Cardiac:    Denies recent chest pain   GI:     No nausea or vomiting, no abdominal pain  Neuro:    No dizziness or light headedness  Musculoskeletal:  No recent active issues  Extremities:   No edema, good peripheral pulses      History reviewed. No pertinent past medical history.     Allergies   Allergen Reactions    Shellfish-Derived Products        Current Outpatient Medications   Medication Sig Dispense Refill    clopidogrel (PLAVIX) 300 MG TABS Take 2 tablets by mouth once for 1 dose 2 tablet 0    clopidogrel (PLAVIX) 75 MG tablet Take 1 tablet by mouth daily 90 tablet 3    isosorbide mononitrate (IMDUR) 30 MG extended release tablet Take 1 tablet by mouth daily 90 tablet 3    aspirin 81 MG chewable tablet Take 1 tablet by mouth daily 30 tablet 3    atorvastatin (LIPITOR) 80 MG tablet Take 1 tablet by mouth nightly 30 tablet 3    lisinopril (PRINIVIL;ZESTRIL) 5 MG tablet Take 1 tablet by mouth daily 30 tablet 3    metoprolol tartrate (LOPRESSOR) 25 MG tablet Take 1 tablet by mouth 2 times daily 60 tablet 3    nitroGLYCERIN (NITROSTAT) 0.4 MG SL tablet Place 1 tablet under the tongue every 5 minutes as needed for Chest pain up to max of 3 total doses. If no relief after 1 dose, call 911. 89 tablet 1     No current facility-administered medications for this visit. Social History     Socioeconomic History    Marital status: Single     Spouse name: None    Number of children: None    Years of education: None    Highest education level: None   Occupational History    None   Social Needs    Financial resource strain: None    Food insecurity     Worry: None     Inability: None    Transportation needs     Medical: None     Non-medical: None   Tobacco Use    Smoking status: Current Every Day Smoker    Smokeless tobacco: Never Used   Substance and Sexual Activity    Alcohol use: None    Drug use: None    Sexual activity: None   Lifestyle    Physical activity     Days per week: None     Minutes per session: None    Stress: None   Relationships    Social connections     Talks on phone: None     Gets together: None     Attends Sikhism service: None     Active member of club or organization: None     Attends meetings of clubs or organizations: None     Relationship status: None    Intimate partner violence     Fear of current or ex partner: None     Emotionally abused: None     Physically abused: None     Forced sexual activity: None   Other Topics Concern    None   Social History Narrative    None       History reviewed. No pertinent family history. Blood pressure (!) 140/84, pulse 84, height 5' 6\" (1.676 m), weight 229 lb (103.9 kg). General:   Well developed, well nourished  Lungs:   Clear to auscultation  Heart:    Normal S1 S2, No murmur, rubs, or gallops  Abdomen:   Soft, non tender, no organomegalies, positive bowel sounds  Extremities:   No edema, no cyanosis, good peripheral pulses  Neurological:   Awake, alert, oriented.  No obvious focal deficits  Musculoskeletal:  No obvious deformities    EK20  Normal sinus rhythm  ST & Marked T wave abnormality, consider anterolateral ischemia  Prolonged QT interval or tu fusion, consider myocardial disease, electrolyte imbalance, or drug effects  Abnormal ECG  When compared with ECG of 20-JUN-2020 09:36,  T wave inversion more evident in Lateral leads  Confirmed by Melanie Frizzle   Echo: 6/20/20  Summary   Left Ventricular size is Mildly increased . Normal left ventricular wall thickness. There were marked regional wall motion abnormalities. Akinetic wall motion of the DISTAL AND MID SEGMENT OF THE SEPTAL AND   anteroseptal wallS,AS WELL AS THE APEX OF the left ventricle. Severly hypokinetic motion of the anterior AND DISTAL inferior walls of   the left ventricle. Systolic function was moderately reduced. Ejection fraction is visually estimated in the range of 35% to 40%. Doppler parameters were consistent with abnormal left ventricular   relaxation (grade 1 diastolic dysfunction). Signature      ----------------------------------------------------------------   Electronically signed by Keke Sosa MD     PROCEDURE PERFORMED:  Left heart catheterization, LV gram, PCI of mid  LAD.     INDICATION FOR STUDY:  ST-segment elevation MI.   CORONARY ANATOMY:  RIGHT CORONARY ARTERY:  Right coronary artery appears to be the dominant  system. There are mild luminal irregularities in the proximal portion. Mid portion, there is at least 50% stenosis followed by mild luminal  irregularities of the distal portion of the RCA.     LEFT MAIN:  Patent.   Gives rise to LAD and left circumflex arteries.     LEFT CIRCUMFLEX ARTERY:  It is patent in the proximal, mid and distal  portions with mild luminal irregularities distally.     LAD:  Patent and ectatic in the proximal portion, followed by thrombotic  99% stenosis in the mid portion of the LAD, followed by very sluggish  flow in mid to distal portion of the LAD.     There is a large-caliber diagonal branch that comes off just prior to  this thrombotic lesion.     LVEDP was measured to be 22 mmHg. There is no obvious gradient across  the aortic valve. LV systolic function was roughly estimated to be 35%  to 40% with apical anterior wall, anterior apical hypokinesia. SUMMARY:  1. Successful PCI of thrombotic mid LAD stenosis with Xience Angelika  drug-eluting stent. 2.  LVEDP was measured to be 22 mmHg. 3.  LV systolic function was estimated at 35% to 40% with anterior,  anterior apical wall hypokinesia.     RECOMMENDATIONS:  1. DAPT for at least one year. 2.  Lipid-lowering therapy. 3.  Aggressive risk factor modification. 4.  Transfer to ICU for close monitoring. 5.  2D echocardiogram.  6.  Get cardiac rehab consult.     All procedure details and management plans were discussed in detail with  the patient and family members and they were in agreement with the plan.           Anna Johnson MD     Diagnosis Orders   1. Hyperlipidemia, unspecified hyperlipidemia type  Lipid Panel    Hepatic Function Panel   2. S/P percutaneous transluminal angioplasty (PTA) with stent placement     3. ST elevation myocardial infarction involving left anterior descending (LAD) coronary artery ([address]     4. Ischemic cardiomyopathy  Echo limited   5. Tobacco abuse         Orders Placed This Encounter   Procedures    Lipid Panel     Standing Status:   Future     Standing Expiration Date:   7/23/2021     Order Specific Question:   Is Patient Fasting?/# of Hours     Answer:   15    Hepatic Function Panel     Standing Status:   Future     Standing Expiration Date:   7/23/2021    Echo limited     Standing Status:   Future     Standing Expiration Date:   7/23/2021     Order Specific Question:   Reason for exam:     Answer:   STEMI, s/p PCI   Successful PCI of thrombotic mid LAD stenosis with Xience Angelika  drug-eluting stent. ICMP EF 35-40, F7OS-lc fluid overload    On asa, lipitor 80 mg, lisinopril, lopressor, brilinta, NTG SL prn  Cannot afford brilinta.  Will load with plavix 600 mg today and then starting tomorrow 75 mg daily  Add imdur 30 mg daily  Refuses cardiac rehab at this time d/t work schedule. Repeat lipid and hepatic panel in 3 months since lipitor started. Repeat limited echo in 3 months to re-evaluate LV function.     Discussed use, benefit, and side effects of prescribed medications. All patient questions answered. Pt voiced understanding. Instructed to continue current medications, diet and exercise. Continue risk factor modification and medical management. Patient agreed with treatment plan. Follow up as directed. Discussed and encouraged smoking cessation to decrease adverse associated health risks including but not limited to heart disease, hypertension, peripheral vascular disease, lung disease, heart attack, stroke, cancer, loss of limb or death.     Continue Dr Flora Nielson current treatment plan  Follow up with Dr Lacy Napier as scheduled or sooner if needed

## 2020-07-24 ENCOUNTER — TELEPHONE (OUTPATIENT)
Dept: CARDIOLOGY CLINIC | Age: 55
End: 2020-07-24

## 2020-10-19 RX ORDER — LISINOPRIL 5 MG/1
5 TABLET ORAL DAILY
Qty: 30 TABLET | Refills: 0 | Status: SHIPPED | OUTPATIENT
Start: 2020-10-19 | End: 2020-11-20 | Stop reason: SDUPTHER

## 2020-10-19 NOTE — TELEPHONE ENCOUNTER
Diamantina Goldmann called requesting a refill on the following medications:  Requested Prescriptions     Pending Prescriptions Disp Refills    metoprolol tartrate (LOPRESSOR) 25 MG tablet 60 tablet 3     Sig: Take 1 tablet by mouth 2 times daily    lisinopril (PRINIVIL;ZESTRIL) 5 MG tablet 30 tablet 3     Sig: Take 1 tablet by mouth daily     Pharmacy verified: [facility]  . pv      Date of last visit: 7/23/2020  Date of next visit (if applicable): 25/97/3379

## 2020-10-27 ENCOUNTER — TELEPHONE (OUTPATIENT)
Dept: CARDIOLOGY CLINIC | Age: 55
End: 2020-10-27

## 2020-10-27 NOTE — TELEPHONE ENCOUNTER
Patient called to cancel his appointment that was Thursday 10/29/2020 because he has not been able to get his labs and echo done yet due to his work schedule and he was assuming that he needed to have those done prior to his appointment. Patient rescheduled appointment to the first available opening 12/24/2020.   DOLV 07/23/2020  Please advise

## 2020-11-02 RX ORDER — NITROGLYCERIN 0.4 MG/1
0.4 TABLET SUBLINGUAL EVERY 5 MIN PRN
Qty: 25 TABLET | Refills: 0 | Status: SHIPPED | OUTPATIENT
Start: 2020-11-02 | End: 2021-02-16 | Stop reason: SDUPTHER

## 2020-11-20 NOTE — TELEPHONE ENCOUNTER
Eber Stone called requesting a refill on the following medications:  Requested Prescriptions     Pending Prescriptions Disp Refills    lisinopril (PRINIVIL;ZESTRIL) 5 MG tablet 30 tablet 0     Sig: Take 1 tablet by mouth daily     Pharmacy verified:  [facility] [place], [place]    Date of last visit: 07/23/2020  Date of next visit (if applicable): 51/05/5800

## 2020-11-23 RX ORDER — LISINOPRIL 5 MG/1
5 TABLET ORAL DAILY
Qty: 30 TABLET | Refills: 1 | Status: SHIPPED | OUTPATIENT
Start: 2020-11-23 | End: 2021-02-01

## 2021-02-01 RX ORDER — LISINOPRIL 5 MG/1
TABLET ORAL
Qty: 30 TABLET | Refills: 0 | Status: SHIPPED | OUTPATIENT
Start: 2021-02-01 | End: 2021-02-08

## 2021-02-08 ENCOUNTER — OFFICE VISIT (OUTPATIENT)
Dept: CARDIOLOGY CLINIC | Age: 56
End: 2021-02-08

## 2021-02-08 VITALS
WEIGHT: 242 LBS | DIASTOLIC BLOOD PRESSURE: 88 MMHG | SYSTOLIC BLOOD PRESSURE: 168 MMHG | HEART RATE: 88 BPM | HEIGHT: 66 IN | BODY MASS INDEX: 38.89 KG/M2

## 2021-02-08 DIAGNOSIS — E78.5 HYPERLIPIDEMIA, UNSPECIFIED HYPERLIPIDEMIA TYPE: ICD-10-CM

## 2021-02-08 DIAGNOSIS — I10 ESSENTIAL HYPERTENSION: ICD-10-CM

## 2021-02-08 DIAGNOSIS — I25.10 CORONARY ARTERY DISEASE DUE TO LIPID RICH PLAQUE: Primary | ICD-10-CM

## 2021-02-08 DIAGNOSIS — M79.606 PAIN OF LOWER EXTREMITY, UNSPECIFIED LATERALITY: ICD-10-CM

## 2021-02-08 DIAGNOSIS — I25.83 CORONARY ARTERY DISEASE DUE TO LIPID RICH PLAQUE: Primary | ICD-10-CM

## 2021-02-08 DIAGNOSIS — R06.02 SHORTNESS OF BREATH: ICD-10-CM

## 2021-02-08 PROCEDURE — 99214 OFFICE O/P EST MOD 30 MIN: CPT | Performed by: INTERNAL MEDICINE

## 2021-02-08 RX ORDER — METOPROLOL SUCCINATE 50 MG/1
50 TABLET, EXTENDED RELEASE ORAL DAILY
Qty: 30 TABLET | Refills: 3 | Status: SHIPPED | OUTPATIENT
Start: 2021-02-08 | End: 2021-06-17

## 2021-02-08 RX ORDER — LISINOPRIL 10 MG/1
10 TABLET ORAL DAILY
Qty: 30 TABLET | Refills: 3 | Status: SHIPPED | OUTPATIENT
Start: 2021-02-08 | End: 2021-06-18 | Stop reason: SDUPTHER

## 2021-02-08 NOTE — PROGRESS NOTES
[address] [facility] [address] [facility] [address]  Dept: [phone]  Dept Fax: [phone]  Loc: [phone]    Visit Date: 2/8/2021    Mr. Petey Ramirez is a 60 y.o. male  who presented for:  Chief Complaint   Patient presents with    3 Month Follow-Up       HPI:   59 yo M c hx of STEMI s/p PCI of LAD 6/20/20 is here for a follow up. He is on DAPT, compliant. Denies any chest pain, sob, palpitations, lightheadedness, dizziness, orthopnea, PND or pedal edema. Current Outpatient Medications:     lisinopril (PRINIVIL;ZESTRIL) 10 MG tablet, Take 1 tablet by mouth daily, Disp: 30 tablet, Rfl: 3    metoprolol succinate (TOPROL XL) 50 MG extended release tablet, Take 1 tablet by mouth daily, Disp: 30 tablet, Rfl: 3    nitroGLYCERIN (NITROSTAT) 0.4 MG SL tablet, Place 1 tablet under the tongue every 5 minutes as needed for Chest pain up to max of 3 total doses. If no relief after 1 dose, call 911., Disp: 25 tablet, Rfl: 0    clopidogrel (PLAVIX) 75 MG tablet, Take 1 tablet by mouth daily, Disp: 90 tablet, Rfl: 3    isosorbide mononitrate (IMDUR) 30 MG extended release tablet, Take 1 tablet by mouth daily, Disp: 90 tablet, Rfl: 3    aspirin 81 MG chewable tablet, Take 1 tablet by mouth daily, Disp: 30 tablet, Rfl: 3    atorvastatin (LIPITOR) 80 MG tablet, Take 1 tablet by mouth nightly, Disp: 30 tablet, Rfl: 3    Past Medical History  Jeremias Wallace  has no past medical history on file. Social History  Jeremias Wallace  reports that he has been smoking. He has never used smokeless tobacco.    Family History  Jeremias Wallace family history is not on file. Past Surgical History   History reviewed. No pertinent surgical history. Subjective:     REVIEW OF SYSTEMS  Constitutional: denies sweats, chills and fever  HENT: denies  congestion, sinus pressure, sneezing and sore throat. Eyes: denies  pain, discharge, redness and itching.    Respiratory: denies apnea, cough  Gastrointestinal: denies blood in stool, constipation, diarrhea   Endocrine: denies cold intolerance, heat intolerance, polydipsia. Genitourinary: denies dysuria, enuresis, flank pain and hematuria. Musculoskeletal: denies arthralgias, joint swelling and neck pain. Neurological: denies numbness and headaches. Psychiatric/Behavioral: denies agitation, confusion, decreased concentration and dysphoric mood    All others reviewed and are negative. Objective:     BP (!) 168/88   Pulse 88   Ht 5' 6\" (1.676 m)   Wt 242 lb (109.8 kg)   BMI 39.06 kg/m²     Wt Readings from Last 3 Encounters:   02/08/21 242 lb (109.8 kg)   07/23/20 229 lb (103.9 kg)   06/20/20 228 lb 9.9 oz (103.7 kg)     BP Readings from Last 3 Encounters:   02/08/21 (!) 168/88   07/23/20 (!) 140/84   06/21/20 (!) 142/95       PHYSICAL EXAM  Constitutional: Oriented to person, place, and time. Appears well-developed and well-nourished. HENT:   Head: Normocephalic and atraumatic. Eyes: EOM are normal. Pupils are equal, round, and reactive to light. Neck: Normal range of motion. Neck supple. No JVD present. Cardiovascular: Normal rate , normal heart sounds and intact distal pulses. Pulmonary/Chest: Effort normal and breath sounds normal. No respiratory distress. No wheezes. No rales. Abdominal: Soft. Bowel sounds are normal. No distension. There is no tenderness. Musculoskeletal: Normal range of motion. No edema. Neurological: Alert and oriented to person, place, and time. No cranial nerve deficit. Coordination normal.   Skin: Skin is warm and dry. Psychiatric: Normal mood and affect.        No results found for: CKTOTAL, CKMB, CKMBINDEX    Lab Results   Component Value Date    WBC 9.8 06/21/2020    RBC 4.85 06/21/2020    HGB 14.5 06/21/2020    HCT 46.7 06/21/2020    MCV 96.3 06/21/2020    MCH 29.9 06/21/2020    MCHC 31.0 06/21/2020     06/21/2020    MPV 10.8 06/21/2020       Lab Results   Component Value Date     06/21/2020    K 4.4 06/21/2020    K 4.0 06/20/2020     06/21/2020    CO2 23 06/21/2020    BUN 11 06/21/2020    LABALBU 3.9 06/20/2020    CREATININE 0.8 06/21/2020    CALCIUM 9.1 06/21/2020    LABGLOM >90 06/21/2020    GLUCOSE 141 06/21/2020       Lab Results   Component Value Date    ALKPHOS 65 06/20/2020    ALT 53 06/20/2020     06/20/2020    PROT 6.3 06/20/2020    BILITOT 0.7 06/20/2020    BILIDIR <0.2 06/20/2020    LABALBU 3.9 06/20/2020       No results found for: MG    No results found for: INR, PROTIME      No results found for: LABA1C    Lab Results   Component Value Date    TRIG 230 06/20/2020    HDL 44 06/20/2020    LDLCALC 77 06/20/2020       No results found for: TSH      Testing Reviewed:      I haveindividually reviewed the below cardiac tests    EKG:    ECHO:   Results for orders placed during the hospital encounter of 06/20/20   ECHO Complete 2D W Doppler W Color    Narrative Transthoracic Echocardiography Report (TTE)     Demographics      Patient Name    Julian Forward Gender                Male      MR #            [id number]       Race                                                        Ethnicity      Account #       [de-identified]       Room Number           3234      Accession       [id number]      Date of Study         06/20/2020   Number      Date of Birth   1965      Referring Physician   Monica Carrillo MD      Age             60 year(s)      Eduardo Cobb, [facility] Larissa Friedman MD                                   Physician     Procedure    Type of Study      TTE procedure:ECHOCARDIOGRAM COMPLETE 2D W DOPPLER W COLOR. Procedure Date  Date: 06/20/2020 Start: 08:11 AM    Study Location: Bedside  Technical Quality: Limited visualization due to restricted mobility. Indications:STEMI.     Additional Medical History:STEMI, Chest pain    Patient Status: Routine    Height: 67 inches Weight: 230 pounds BSA: 2.15 m^2 BMI: 36.02 kg/m^2    BP: 139/107 mmHg    Allergies    - See Epic. Conclusions      Summary   Left Ventricular size is Mildly increased . Normal left ventricular wall thickness. There were marked regional wall motion abnormalities. Akinetic wall motion of the DISTAL AND MID SEGMENT OF THE SEPTAL AND   anteroseptal wallS,AS WELL AS THE APEX OF the left ventricle. Severly hypokinetic motion of the anterior AND DISTAL inferior walls of   the left ventricle. Systolic function was moderately reduced. Ejection fraction is visually estimated in the range of 35% to 40%. Doppler parameters were consistent with abnormal left ventricular   relaxation (grade 1 diastolic dysfunction). Signature      ----------------------------------------------------------------   Electronically signed by Michaelle Valerio MD (Interpreting   physician) on 06/20/2020 at 04:28 PM   ----------------------------------------------------------------      Findings      Mitral Valve   The mitral valve structure was normal with normal leaflet separation. DOPPLER: The transmitral velocity was within the normal range with no   evidence for mitral stenosis. Mild mitral regurgitation is present. Aortic Valve   The aortic valve was trileaflet with normal thickness and cuspal   separation. DOPPLER: Transaortic velocity was within the normal range with   no evidence of aortic stenosis. There was no evidence of aortic   regurgitation. Tricuspid Valve   The tricuspid valve structure was normal with normal leaflet separation. DOPPLER: There was no evidence of tricuspid stenosis. There was no   evidence of tricuspid regurgitation. Pulmonic Valve   The pulmonic valve leaflets exhibited normal thickness, no calcification,   and normal cuspal separation. DOPPLER: The transpulmonic velocity was   within the normal range with no evidence for regurgitation.       Left Atrium   Left atrial size was normal.      Left Ventricle   Left Ventricular size is Mildly increased . Normal left ventricular wall thickness. Akinetic motion of the DISTAL AND MID SEGMENT OF THE SEPTAL AND   anteroseptal wallS,AS WELL AS THE APEX OF the left ventricle. Severly hypokinetic motion of the anterior AND DISTAL inferior walls of   the left ventricle. Systolic function was moderately reduced. Ejection fraction is visually estimated in the range of 35% to 40%. Doppler parameters were consistent with abnormal left ventricular   relaxation (grade 1 diastolic dysfunction). There were marked regional wall motion abnormalities. Right Atrium   Right atrial size was normal.      Right Ventricle   The right ventricular size was normal with normal systolic function and   wall thickness. Pericardial Effusion   The pericardium was normal in appearance with no evidence of a pericardial   effusion. Pleural Effusion   No evidence of pleural effusion. Aorta / Great Vessels   -Aortic root dimension within normal limits.   -The Pulmonary artery is within normal limits. -IVC size is within normal limits with normal respiratory phasic changes.      M-Mode/2D Measurements & Calculations      LV Diastolic   LV Systolic Dimension:    AV Cusp Separation: 1.8 cmLA   Dimension: 5.4 3.8 cm                    Dimension: 3.7 cmAO Root   cm             LV Volume Diastolic:      Dimension: 3.5 cmLA Area: 12.7   LV FS:29.6 %   178.9 ml                  cm^2   LV PW          LV Volume Systolic: 51.9   Diastolic: 1   ml   cm             LV EDV/LV EDV Index:   Septum         178.9 ml/83 m^2LV ESV/LV  RV Diastolic Dimension: 2.3 cm   Diastolic: 1.1 ESV Index: 21.4 ml/46 m^2   cm             EF Calculated: 44.3 %     LA/Aorta: 1.06                     LV Length: 9.1 cm         LA volume/Index: 29.1 ml /14m^2      LV Area   Diastolic: 45   cm^2   LV Area   Systolic: 31   cm^2     Doppler Measurements & Calculations      MV Peak E-Wave: 57 cm/s    AV Peak Velocity: 129  LVOT Peak Velocity: 77.1   MV Peak A-Wave: 90.4 cm/s  cm/s                   cm/s   MV E/A Ratio: 0.63         AV Peak Gradient: 6.66 LVOT Peak Gradient: 2   MV Peak Gradient: 1.3 mmHg mmHg                   mmHg      MV Deceleration Time: 173   msec      MV E' Septal Velocity: 4.5                        PV Peak Velocity: 62.6   cm/s                                              cm/s   MV A' Septal Velocity:     AV DVI (Vmax):0.6      PV Peak Gradient: 1.57   10.2 cm/s                                         mmHg   MV E' Lateral Velocity: 6   cm/s   MV A' Lateral Velocity:   12.5 cm/s   E/E' septal: 12.67   E/E' lateral: 9.5     [email or web address] [id number]%[id number]       STRESS:    CATH:    Assessment/Plan       Diagnosis Orders   1. Coronary artery disease due to lipid rich plaque         Recent STEMI, PCI of LAD 6/2020  LE pain on the left side  Smoker  LV dysfunction EF 35-40%  HTN  HLD    Continue DAPT  Needs to repeat Echo  Will get GATITO  Continue metoprolol,   Will increase lisinopril 5mg to 10mg daily  Continue Imdur  The patient is asked to make an attempt to improve diet and exercise patterns to aid in medical management of this problem. Advised more plant based nutrition/meditarrean diet   Advised patient to call office or seek immediate medical attention if there is any new onset of  any chest pain, sob, palpitations, lightheadedness, dizziness, orthopnea, PND or pedal edema. All medication side effects were discussed in details. Thank youfor allowing me to participate in the care of this patient. Please do not hesitate to contact me for any further questions. Return in about 8 weeks (around 4/5/2021), or if symptoms worsen or fail to improve, for Regular follow up, Review testing.        Electronically signed by Mil Chandler MD [facility]  2/8/2021 at 4:03 PM

## 2021-02-15 NOTE — TELEPHONE ENCOUNTER
Gino Dates called requesting a refill on the following medications:  Requested Prescriptions     Pending Prescriptions Disp Refills    nitroGLYCERIN (NITROSTAT) 0.4 MG SL tablet 25 tablet 0     Sig: Place 1 tablet under the tongue every 5 minutes as needed for Chest pain up to max of 3 total doses. If no relief after 1 dose, call 911.      Pharmacy verified:  [facility]    Date of last visit: 02/08/21  Date of next visit (if applicable): 1/0/8660

## 2021-02-16 RX ORDER — NITROGLYCERIN 0.4 MG/1
0.4 TABLET SUBLINGUAL EVERY 5 MIN PRN
Qty: 25 TABLET | Refills: 1 | Status: SHIPPED | OUTPATIENT
Start: 2021-02-16 | End: 2021-07-28 | Stop reason: SDUPTHER

## 2021-02-16 NOTE — TELEPHONE ENCOUNTER
Pt is calling to check on this refill request. He is wanting to know if this is important? And wanting to know why these havent been refilled? Patient was asking multiple questions that I am not able to answer in regards to his meds.   Please advise  [phone]

## 2021-02-17 ENCOUNTER — TELEPHONE (OUTPATIENT)
Dept: CARDIOLOGY CLINIC | Age: 56
End: 2021-02-17

## 2021-02-17 NOTE — TELEPHONE ENCOUNTER
Called patient to get him set up for his testing . He stated that he was out at [facility] with some friends and he would call us back to get scheduled. Previous left a message too and he didn't return that call.

## 2021-06-17 NOTE — TELEPHONE ENCOUNTER
Mary Grady called requesting a refill on the following medications:  Requested Prescriptions     Pending Prescriptions Disp Refills    metoprolol succinate (TOPROL XL) 50 MG extended release tablet 30 tablet 3     Sig: Take 1 tablet by mouth daily    lisinopril (PRINIVIL;ZESTRIL) 10 MG tablet 30 tablet 3     Sig: Take 1 tablet by mouth daily     Pharmacy [facility] [place], [place]    Date of last visit: 02/08/2021  Date of next visit (if applicable): 5/34/6997      *patient believes he may be close to running out on some of his other prescriptions, but did not have them available to verify.

## 2021-06-18 RX ORDER — METOPROLOL SUCCINATE 50 MG/1
TABLET, EXTENDED RELEASE ORAL
Qty: 30 TABLET | Refills: 2 | Status: SHIPPED | OUTPATIENT
Start: 2021-06-18 | End: 2021-08-19

## 2021-06-18 RX ORDER — METOPROLOL SUCCINATE 50 MG/1
50 TABLET, EXTENDED RELEASE ORAL DAILY
Qty: 30 TABLET | Refills: 3 | Status: SHIPPED | OUTPATIENT
Start: 2021-06-18 | End: 2021-08-19 | Stop reason: SDUPTHER

## 2021-06-18 RX ORDER — LISINOPRIL 10 MG/1
10 TABLET ORAL DAILY
Qty: 30 TABLET | Refills: 3 | Status: SHIPPED | OUTPATIENT
Start: 2021-06-18 | End: 2021-08-19 | Stop reason: SDUPTHER

## 2021-07-28 RX ORDER — NITROGLYCERIN 0.4 MG/1
0.4 TABLET SUBLINGUAL EVERY 5 MIN PRN
Qty: 25 TABLET | Refills: 1 | Status: SHIPPED | OUTPATIENT
Start: 2021-07-28 | End: 2021-08-19 | Stop reason: SDUPTHER

## 2021-07-28 RX ORDER — CLOPIDOGREL BISULFATE 75 MG/1
75 TABLET ORAL DAILY
Qty: 90 TABLET | Refills: 0 | Status: SHIPPED | OUTPATIENT
Start: 2021-07-28 | End: 2021-08-19 | Stop reason: SDUPTHER

## 2021-07-28 NOTE — TELEPHONE ENCOUNTER
Yola Das called requesting a refill on the following medications:  Requested Prescriptions     Pending Prescriptions Disp Refills    clopidogrel (PLAVIX) 75 MG tablet 90 tablet 3     Sig: Take 1 tablet by mouth daily    nitroGLYCERIN (NITROSTAT) 0.4 MG SL tablet 25 tablet 1     Sig: Place 1 tablet under the tongue every 5 minutes as needed for Chest pain up to max of 3 total doses. If no relief after 1 dose, call 911.      Pharmacy verified:  [facility]    Date of last visit: 02-08-21  Date of next visit (if applicable): 8/67/7593

## 2021-08-02 NOTE — TELEPHONE ENCOUNTER
Christopher Sam called requesting a refill on the following medications:  Requested Prescriptions     Pending Prescriptions Disp Refills    isosorbide mononitrate (IMDUR) 30 MG extended release tablet 90 tablet 3     Sig: Take 1 tablet by mouth daily     Pharmacy verified:  [email or web address]    Date of last visit: 02/08/2021  Date of next visit (if applicable): 1/18/1296

## 2021-08-03 RX ORDER — ISOSORBIDE MONONITRATE 30 MG/1
30 TABLET, EXTENDED RELEASE ORAL DAILY
Qty: 90 TABLET | Refills: 0 | Status: SHIPPED | OUTPATIENT
Start: 2021-08-03 | End: 2021-08-19 | Stop reason: SDUPTHER

## 2021-08-19 ENCOUNTER — OFFICE VISIT (OUTPATIENT)
Dept: CARDIOLOGY CLINIC | Age: 56
End: 2021-08-19

## 2021-08-19 VITALS
DIASTOLIC BLOOD PRESSURE: 92 MMHG | SYSTOLIC BLOOD PRESSURE: 142 MMHG | HEART RATE: 86 BPM | BODY MASS INDEX: 37.98 KG/M2 | HEIGHT: 67 IN | WEIGHT: 242 LBS

## 2021-08-19 DIAGNOSIS — R06.02 SHORTNESS OF BREATH: Primary | ICD-10-CM

## 2021-08-19 PROCEDURE — 99213 OFFICE O/P EST LOW 20 MIN: CPT | Performed by: INTERNAL MEDICINE

## 2021-08-19 PROCEDURE — 93000 ELECTROCARDIOGRAM COMPLETE: CPT | Performed by: INTERNAL MEDICINE

## 2021-08-19 RX ORDER — LISINOPRIL 10 MG/1
10 TABLET ORAL DAILY
Qty: 90 TABLET | Refills: 3 | Status: SHIPPED | OUTPATIENT
Start: 2021-08-19 | End: 2022-09-06

## 2021-08-19 RX ORDER — CLOPIDOGREL BISULFATE 75 MG/1
75 TABLET ORAL DAILY
Qty: 90 TABLET | Refills: 3 | Status: SHIPPED | OUTPATIENT
Start: 2021-08-19 | End: 2022-09-06

## 2021-08-19 RX ORDER — METOPROLOL SUCCINATE 50 MG/1
50 TABLET, EXTENDED RELEASE ORAL DAILY
Qty: 90 TABLET | Refills: 3 | Status: SHIPPED | OUTPATIENT
Start: 2021-08-19 | End: 2022-09-06

## 2021-08-19 RX ORDER — ISOSORBIDE MONONITRATE 30 MG/1
30 TABLET, EXTENDED RELEASE ORAL DAILY
Qty: 90 TABLET | Refills: 3 | Status: SHIPPED | OUTPATIENT
Start: 2021-08-19 | End: 2022-09-06 | Stop reason: SDUPTHER

## 2021-08-19 RX ORDER — NITROGLYCERIN 0.4 MG/1
0.4 TABLET SUBLINGUAL EVERY 5 MIN PRN
Qty: 25 TABLET | Refills: 1 | Status: SHIPPED | OUTPATIENT
Start: 2021-08-19 | End: 2022-06-02

## 2021-08-19 RX ORDER — ATORVASTATIN CALCIUM 80 MG/1
80 TABLET, FILM COATED ORAL NIGHTLY
Qty: 90 TABLET | Refills: 3 | Status: SHIPPED | OUTPATIENT
Start: 2021-08-19 | End: 2022-09-06 | Stop reason: SDUPTHER

## 2021-08-19 NOTE — PROGRESS NOTES
[id number] [facility] [address] [place] [address]  Dept: [phone]  Dept Fax: [phone]  Loc: [phone]    Visit Date: 8/19/2021    Mr. Abeba Garber is a 60 y.o. male  who presented for:  Chief Complaint   Patient presents with   Demetrio Riley       HPI:   59 yo M c hx of STEMI s/p PCI of LAD 6/20/20 is here for a follow up. He is on DAPT, compliant. Denies any chest pain, sob, palpitations, lightheadedness, dizziness, orthopnea, PND or pedal edema. Currently smokes 1 ppd. Current Outpatient Medications:     atorvastatin (LIPITOR) 80 MG tablet, Take 1 tablet by mouth nightly, Disp: 90 tablet, Rfl: 3    isosorbide mononitrate (IMDUR) 30 MG extended release tablet, Take 1 tablet by mouth daily, Disp: 90 tablet, Rfl: 3    clopidogrel (PLAVIX) 75 MG tablet, Take 1 tablet by mouth daily, Disp: 90 tablet, Rfl: 3    metoprolol succinate (TOPROL XL) 50 MG extended release tablet, Take 1 tablet by mouth daily, Disp: 90 tablet, Rfl: 3    lisinopril (PRINIVIL;ZESTRIL) 10 MG tablet, Take 1 tablet by mouth daily, Disp: 90 tablet, Rfl: 3    nitroGLYCERIN (NITROSTAT) 0.4 MG SL tablet, Place 1 tablet under the tongue every 5 minutes as needed for Chest pain up to max of 3 total doses. , Disp: 25 tablet, Rfl: 1    aspirin 81 MG chewable tablet, Take 1 tablet by mouth daily, Disp: 30 tablet, Rfl: 3    Past Medical History  Darrell Foster  has no past medical history on file. Social History  Darrell Foster  reports that he has been smoking. He has never used smokeless tobacco.    Family History  Darrell Foster family history is not on file. Past Surgical History   History reviewed. No pertinent surgical history. Subjective:     REVIEW OF SYSTEMS  Constitutional: denies sweats, chills and fever  HENT: denies  congestion, sinus pressure, sneezing and sore throat. Eyes: denies  pain, discharge, redness and itching.    Respiratory: denies apnea, cough  Gastrointestinal: denies blood in stool, constipation, diarrhea   Endocrine: denies cold intolerance, heat intolerance, polydipsia. Genitourinary: denies dysuria, enuresis, flank pain and hematuria. Musculoskeletal: denies arthralgias, joint swelling and neck pain. Neurological: denies numbness and headaches. Psychiatric/Behavioral: denies agitation, confusion, decreased concentration and dysphoric mood    All others reviewed and are negative. Objective:     BP (!) 142/92   Pulse 86   Ht 5' 6.5\" (1.689 m)   Wt 242 lb (109.8 kg)   BMI 38.47 kg/m²     Wt Readings from Last 3 Encounters:   08/19/21 242 lb (109.8 kg)   02/08/21 242 lb (109.8 kg)   07/23/20 229 lb (103.9 kg)     BP Readings from Last 3 Encounters:   08/19/21 (!) 142/92   02/08/21 (!) 168/88   07/23/20 (!) 140/84       PHYSICAL EXAM  Constitutional: Oriented to person, place, and time. Appears well-developed and well-nourished. HENT:   Head: Normocephalic and atraumatic. Eyes: EOM are normal. Pupils are equal, round, and reactive to light. Neck: Normal range of motion. Neck supple. No JVD present. Cardiovascular: Normal rate , normal heart sounds and intact distal pulses. Pulmonary/Chest: Effort normal and breath sounds normal. No respiratory distress. No wheezes. No rales. Abdominal: Soft. Bowel sounds are normal. No distension. There is no tenderness. Musculoskeletal: Normal range of motion. No edema. Neurological: Alert and oriented to person, place, and time. No cranial nerve deficit. Coordination normal.   Skin: Skin is warm and dry. Psychiatric: Normal mood and affect.        No results found for: CKTOTAL, CKMB, CKMBINDEX    Lab Results   Component Value Date    WBC 9.8 06/21/2020    RBC 4.85 06/21/2020    HGB 14.5 06/21/2020    HCT 46.7 06/21/2020    MCV 96.3 06/21/2020    MCH 29.9 06/21/2020    MCHC 31.0 06/21/2020     06/21/2020    MPV 10.8 06/21/2020       Lab Results   Component Value Date     06/21/2020    K 4.4 06/21/2020    K 4.0 06/20/2020     06/21/2020    CO2 23 06/21/2020    BUN 11 06/21/2020    LABALBU 3.9 06/20/2020    CREATININE 0.8 06/21/2020    CALCIUM 9.1 06/21/2020    LABGLOM >90 06/21/2020    GLUCOSE 141 06/21/2020       Lab Results   Component Value Date    ALKPHOS 65 06/20/2020    ALT 53 06/20/2020     06/20/2020    PROT 6.3 06/20/2020    BILITOT 0.7 06/20/2020    BILIDIR <0.2 06/20/2020    LABALBU 3.9 06/20/2020       No results found for: MG    No results found for: INR, PROTIME      No results found for: LABA1C    Lab Results   Component Value Date    TRIG 230 06/20/2020    HDL 44 06/20/2020    LDLCALC 77 06/20/2020       No results found for: TSH      Testing Reviewed:      I haveindividually reviewed the below cardiac tests    EKG:    ECHO:   Results for orders placed during the hospital encounter of 06/20/20   ECHO Complete 2D W Doppler W Color    Narrative Transthoracic Echocardiography Report (TTE)     Demographics      Patient Name    Agustin Villalobos Gender                Male      MR #            [id number]       Race                                                        Ethnicity      Account #       [de-identified]       Room Number           3340      Accession       [id number]      Date of Study         06/20/2020   Number      Date of Birth   1965      Referring Physician   Kasey Jenkins MD      Age             60 year(s)      Kristina Paulino MARGARITO Judge MD                                   Physician     Procedure    Type of Study      TTE procedure:ECHOCARDIOGRAM COMPLETE 2D W DOPPLER W COLOR. Procedure Date  Date: 06/20/2020 Start: 08:11 AM    Study Location: Bedside  Technical Quality: Limited visualization due to restricted mobility. Indications:STEMI.     Additional Medical History:STEMI, Chest pain    Patient Status: Routine    Height: 67 inches Weight: 230 pounds BSA: 2.15 m^2 BMI: 36.02 kg/m^2    BP: 139/107 mmHg    Allergies    - See Epic. Conclusions      Summary   Left Ventricular size is Mildly increased . Normal left ventricular wall thickness. There were marked regional wall motion abnormalities. Akinetic wall motion of the DISTAL AND MID SEGMENT OF THE SEPTAL AND   anteroseptal wallS,AS WELL AS THE APEX OF the left ventricle. Severly hypokinetic motion of the anterior AND DISTAL inferior walls of   the left ventricle. Systolic function was moderately reduced. Ejection fraction is visually estimated in the range of 35% to 40%. Doppler parameters were consistent with abnormal left ventricular   relaxation (grade 1 diastolic dysfunction). Signature      ----------------------------------------------------------------   Electronically signed by Bonilla Miller MD (Interpreting   physician) on 06/20/2020 at 04:28 PM   ----------------------------------------------------------------      Findings      Mitral Valve   The mitral valve structure was normal with normal leaflet separation. DOPPLER: The transmitral velocity was within the normal range with no   evidence for mitral stenosis. Mild mitral regurgitation is present. Aortic Valve   The aortic valve was trileaflet with normal thickness and cuspal   separation. DOPPLER: Transaortic velocity was within the normal range with   no evidence of aortic stenosis. There was no evidence of aortic   regurgitation. Tricuspid Valve   The tricuspid valve structure was normal with normal leaflet separation. DOPPLER: There was no evidence of tricuspid stenosis. There was no   evidence of tricuspid regurgitation. Pulmonic Valve   The pulmonic valve leaflets exhibited normal thickness, no calcification,   and normal cuspal separation. DOPPLER: The transpulmonic velocity was   within the normal range with no evidence for regurgitation.       Left Atrium   Left atrial size was normal. Left Ventricle   Left Ventricular size is Mildly increased . Normal left ventricular wall thickness. Akinetic motion of the DISTAL AND MID SEGMENT OF THE SEPTAL AND   anteroseptal wallS,AS WELL AS THE APEX OF the left ventricle. Severly hypokinetic motion of the anterior AND DISTAL inferior walls of   the left ventricle. Systolic function was moderately reduced. Ejection fraction is visually estimated in the range of 35% to 40%. Doppler parameters were consistent with abnormal left ventricular   relaxation (grade 1 diastolic dysfunction). There were marked regional wall motion abnormalities. Right Atrium   Right atrial size was normal.      Right Ventricle   The right ventricular size was normal with normal systolic function and   wall thickness. Pericardial Effusion   The pericardium was normal in appearance with no evidence of a pericardial   effusion. Pleural Effusion   No evidence of pleural effusion. Aorta / Great Vessels   -Aortic root dimension within normal limits.   -The Pulmonary artery is within normal limits. -IVC size is within normal limits with normal respiratory phasic changes.      M-Mode/2D Measurements & Calculations      LV Diastolic   LV Systolic Dimension:    AV Cusp Separation: 1.8 cmLA   Dimension: 5.4 3.8 cm                    Dimension: 3.7 cmAO Root   cm             LV Volume Diastolic:      Dimension: 3.5 cmLA Area: 12.7   LV FS:29.6 %   178.9 ml                  cm^2   LV PW          LV Volume Systolic: 30.2   Diastolic: 1   ml   cm             LV EDV/LV EDV Index:   Septum         178.9 ml/83 m^2LV ESV/LV  RV Diastolic Dimension: 2.3 cm   Diastolic: 1.1 ESV Index: 52.2 ml/46 m^2   cm             EF Calculated: 44.3 %     LA/Aorta: 1.06                     LV Length: 9.1 cm         LA volume/Index: 29.1 ml /14m^2      LV Area   Diastolic: 45   cm^2   LV Area   Systolic: 31   cm^2     Doppler Measurements & Calculations      MV Peak E-Wave: 57 cm/s AV Peak Velocity: 129  LVOT Peak Velocity: 77.1   MV Peak A-Wave: 90.4 cm/s  cm/s                   cm/s   MV E/A Ratio: 0.63         AV Peak Gradient: 6.66 LVOT Peak Gradient: 2   MV Peak Gradient: 1.3 mmHg mmHg                   mmHg      MV Deceleration Time: 173   msec      MV E' Septal Velocity: 4.5                        PV Peak Velocity: 62.6   cm/s                                              cm/s   MV A' Septal Velocity:     AV DVI (Vmax):0.6      PV Peak Gradient: 1.57   10.2 cm/s                                         mmHg   MV E' Lateral Velocity: 6   cm/s   MV A' Lateral Velocity:   12.5 cm/s   E/E' septal: 12.67   E/E' lateral: 9.5     [email or web address] [id number]       STRESS:    CATH:    Assessment/Plan       Diagnosis Orders   1. Shortness of breath  EKG 12 lead    Echo 2D w doppler w color complete       Hx STEMI, PCI of LAD 6/2020  LE pain on the left side  Smoker  LV dysfunction EF 35-40%  HTN  HLD    Continue DAPT  Needs to repeat Echo  Will get GATITO  Continue metoprolol,   lisinopril 10mg daily  Continue Imdur  The patient is asked to make an attempt to improve diet and exercise patterns to aid in medical management of this problem. Advised more plant based nutrition/meditarrean diet   Advised patient to call office or seek immediate medical attention if there is any new onset of  any chest pain, sob, palpitations, lightheadedness, dizziness, orthopnea, PND or pedal edema. All medication side effects were discussed in details. Thank youfor allowing me to participate in the care of this patient. Please do not hesitate to contact me for any further questions. Return in about 1 year (around 8/19/2022), or if symptoms worsen or fail to improve, for Review testing, Regular follow up.        Electronically signed by Mignon Babcock MD [facility]  8/19/2021 at 4:03 PM EST

## 2021-09-28 ENCOUNTER — TELEPHONE (OUTPATIENT)
Dept: CARDIOLOGY CLINIC | Age: 56
End: 2021-09-28

## 2022-06-02 RX ORDER — NITROGLYCERIN 0.4 MG/1
TABLET SUBLINGUAL
Qty: 25 TABLET | Refills: 0 | Status: SHIPPED | OUTPATIENT
Start: 2022-06-02

## 2022-08-31 NOTE — TELEPHONE ENCOUNTER
Pt needed to r/s his appt on 8/31/22 he r/s for 10/10/22. Pt says he needs a refill on all of his medications to make it to his next appt.     [address], [place]

## 2022-09-06 RX ORDER — METOPROLOL SUCCINATE 50 MG/1
TABLET, EXTENDED RELEASE ORAL
Qty: 90 TABLET | Refills: 0 | Status: SHIPPED | OUTPATIENT
Start: 2022-09-06

## 2022-09-06 RX ORDER — METOPROLOL SUCCINATE 50 MG/1
50 TABLET, EXTENDED RELEASE ORAL DAILY
Qty: 90 TABLET | Refills: 0 | Status: SHIPPED | OUTPATIENT
Start: 2022-09-06

## 2022-09-06 RX ORDER — LISINOPRIL 10 MG/1
10 TABLET ORAL DAILY
Qty: 90 TABLET | Refills: 0 | Status: SHIPPED | OUTPATIENT
Start: 2022-09-06

## 2022-09-06 RX ORDER — LISINOPRIL 10 MG/1
TABLET ORAL
Qty: 90 TABLET | Refills: 0 | Status: SHIPPED | OUTPATIENT
Start: 2022-09-06

## 2022-09-06 RX ORDER — CLOPIDOGREL BISULFATE 75 MG/1
75 TABLET ORAL DAILY
Qty: 90 TABLET | Refills: 0 | Status: SHIPPED | OUTPATIENT
Start: 2022-09-06

## 2022-09-06 RX ORDER — ISOSORBIDE MONONITRATE 30 MG/1
30 TABLET, EXTENDED RELEASE ORAL DAILY
Qty: 90 TABLET | Refills: 0 | Status: SHIPPED | OUTPATIENT
Start: 2022-09-06

## 2022-09-06 RX ORDER — CLOPIDOGREL BISULFATE 75 MG/1
TABLET ORAL
Qty: 90 TABLET | Refills: 0 | Status: SHIPPED | OUTPATIENT
Start: 2022-09-06

## 2022-09-06 RX ORDER — ATORVASTATIN CALCIUM 80 MG/1
80 TABLET, FILM COATED ORAL NIGHTLY
Qty: 90 TABLET | Refills: 0 | Status: SHIPPED | OUTPATIENT
Start: 2022-09-06

## 2022-11-11 NOTE — TELEPHONE ENCOUNTER
Mio Villarreal called requesting a refill on the following medications:  Requested Prescriptions     Pending Prescriptions Disp Refills    isosorbide mononitrate (IMDUR) 30 MG extended release tablet 90 tablet 0     Sig: Take 1 tablet by mouth daily     [facility]   . [facility]      Date of last visit:   Date of next visit (if applicable): 71/31/0258

## 2022-11-14 RX ORDER — ISOSORBIDE MONONITRATE 30 MG/1
30 TABLET, EXTENDED RELEASE ORAL DAILY
Qty: 90 TABLET | Refills: 3 | Status: SHIPPED | OUTPATIENT
Start: 2022-11-14

## 2023-03-06 RX ORDER — METOPROLOL SUCCINATE 50 MG/1
TABLET, EXTENDED RELEASE ORAL
Qty: 30 TABLET | Refills: 0 | Status: SHIPPED | OUTPATIENT
Start: 2023-03-06

## 2023-03-06 RX ORDER — LISINOPRIL 10 MG/1
TABLET ORAL
Qty: 30 TABLET | Refills: 0 | Status: SHIPPED | OUTPATIENT
Start: 2023-03-06

## 2023-03-06 RX ORDER — CLOPIDOGREL BISULFATE 75 MG/1
TABLET ORAL
Qty: 30 TABLET | Refills: 0 | Status: SHIPPED | OUTPATIENT
Start: 2023-03-06

## 2023-03-31 ENCOUNTER — OFFICE VISIT (OUTPATIENT)
Dept: CARDIOLOGY CLINIC | Age: 58
End: 2023-03-31

## 2023-03-31 VITALS
SYSTOLIC BLOOD PRESSURE: 158 MMHG | DIASTOLIC BLOOD PRESSURE: 92 MMHG | BODY MASS INDEX: 35.63 KG/M2 | HEIGHT: 67 IN | WEIGHT: 227 LBS | HEART RATE: 93 BPM

## 2023-03-31 DIAGNOSIS — R07.9 CHEST PAIN IN ADULT: ICD-10-CM

## 2023-03-31 DIAGNOSIS — I25.5 ISCHEMIC CARDIOMYOPATHY: Primary | ICD-10-CM

## 2023-03-31 PROCEDURE — 93000 ELECTROCARDIOGRAM COMPLETE: CPT | Performed by: INTERNAL MEDICINE

## 2023-03-31 PROCEDURE — 99213 OFFICE O/P EST LOW 20 MIN: CPT | Performed by: INTERNAL MEDICINE

## 2023-03-31 RX ORDER — METOPROLOL SUCCINATE 50 MG/1
50 TABLET, EXTENDED RELEASE ORAL DAILY
Qty: 30 TABLET | Refills: 11 | Status: SHIPPED | OUTPATIENT
Start: 2023-03-31

## 2023-03-31 RX ORDER — CLOPIDOGREL BISULFATE 75 MG/1
75 TABLET ORAL DAILY
Qty: 30 TABLET | Refills: 11 | Status: SHIPPED | OUTPATIENT
Start: 2023-03-31

## 2023-03-31 RX ORDER — NITROGLYCERIN 0.4 MG/1
0.4 TABLET SUBLINGUAL EVERY 5 MIN PRN
Qty: 25 TABLET | Refills: 3 | Status: SHIPPED | OUTPATIENT
Start: 2023-03-31

## 2023-03-31 RX ORDER — LISINOPRIL 10 MG/1
10 TABLET ORAL DAILY
Qty: 30 TABLET | Refills: 11 | Status: SHIPPED | OUTPATIENT
Start: 2023-03-31

## 2023-03-31 RX ORDER — ISOSORBIDE MONONITRATE 30 MG/1
30 TABLET, EXTENDED RELEASE ORAL DAILY
Qty: 30 TABLET | Refills: 11 | Status: SHIPPED | OUTPATIENT
Start: 2023-03-31

## 2023-03-31 RX ORDER — ATORVASTATIN CALCIUM 80 MG/1
80 TABLET, FILM COATED ORAL NIGHTLY
Qty: 30 TABLET | Refills: 11 | Status: SHIPPED | OUTPATIENT
Start: 2023-03-31

## 2023-03-31 NOTE — PROGRESS NOTES
Pt here for 1 yr check up     EKG done today     Pt c/o chest pain, rates pain 6-7/10, describes as sharp pain, started noticing over the last month    Pt continues with sob on exertion , heart palpitations     Pt having trouble with cost of meds

## 2023-03-31 NOTE — PROGRESS NOTES
[id number] [facility] [address] [place] [address]  Dept: [phone]  Dept Fax: [phone]  Loc: [phone]    Visit Date: 3/31/2023    Mr. Rayna Gonzales is a 59 y.o. male  who presented for:  Chief Complaint   Patient presents with    Check-Up    Coronary Artery Disease       HPI:   60 yo M c hx of STEMI s/p PCI of LAD 6/20/20 is here for a follow up. He is on DAPT, compliant. Denies any chest pain, sob, palpitations, lightheadedness, dizziness, orthopnea, PND or pedal edema. Currently smokes 1 ppd. Has been reporting heart burn symptoms. Current Outpatient Medications:     nitroGLYCERIN (NITROSTAT) 0.4 MG SL tablet, Place 1 tablet under the tongue every 5 minutes as needed for Chest pain up to max of 3 total doses. If no relief after 1 dose, call 911., Disp: 25 tablet, Rfl: 3    metoprolol succinate (TOPROL XL) 50 MG extended release tablet, Take 1 tablet by mouth daily, Disp: 30 tablet, Rfl: 11    clopidogrel (PLAVIX) 75 MG tablet, Take 1 tablet by mouth daily, Disp: 30 tablet, Rfl: 11    atorvastatin (LIPITOR) 80 MG tablet, Take 1 tablet by mouth nightly, Disp: 30 tablet, Rfl: 11    isosorbide mononitrate (IMDUR) 30 MG extended release tablet, Take 1 tablet by mouth daily, Disp: 30 tablet, Rfl: 11    lisinopril (PRINIVIL;ZESTRIL) 10 MG tablet, Take 1 tablet by mouth daily, Disp: 30 tablet, Rfl: 11    aspirin 81 MG chewable tablet, Take 1 tablet by mouth daily, Disp: 30 tablet, Rfl: 3    Past Medical History  Rigoberto Nick  has a past medical history of Eye infection. Social History  Rigoberto Nick  reports that he has been smoking. He has never used smokeless tobacco.    Family History  Rigoberto Nick family history is not on file. Past Surgical History   No past surgical history on file. Subjective:     REVIEW OF SYSTEMS  Constitutional: denies sweats, chills and fever  HENT: denies  congestion, sinus pressure, sneezing and sore throat.

## 2024-02-14 RX ORDER — ISOSORBIDE MONONITRATE 30 MG/1
30 TABLET, EXTENDED RELEASE ORAL DAILY
Qty: 30 TABLET | Refills: 1 | Status: SHIPPED | OUTPATIENT
Start: 2024-02-14

## 2024-04-01 RX ORDER — CLOPIDOGREL BISULFATE 75 MG/1
75 TABLET ORAL DAILY
Qty: 90 TABLET | Refills: 0 | Status: SHIPPED | OUTPATIENT
Start: 2024-04-01

## 2024-04-01 RX ORDER — METOPROLOL SUCCINATE 50 MG/1
50 TABLET, EXTENDED RELEASE ORAL DAILY
Qty: 90 TABLET | Refills: 0 | Status: SHIPPED | OUTPATIENT
Start: 2024-04-01

## 2024-04-01 RX ORDER — LISINOPRIL 10 MG/1
10 TABLET ORAL DAILY
Qty: 90 TABLET | Refills: 0 | Status: SHIPPED | OUTPATIENT
Start: 2024-04-01

## 2024-04-05 RX ORDER — ISOSORBIDE MONONITRATE 30 MG/1
30 TABLET, EXTENDED RELEASE ORAL DAILY
Qty: 90 TABLET | Refills: 3 | Status: SHIPPED | OUTPATIENT
Start: 2024-04-05

## 2024-04-05 RX ORDER — ATORVASTATIN CALCIUM 80 MG/1
80 TABLET, FILM COATED ORAL NIGHTLY
Qty: 90 TABLET | Refills: 3 | Status: SHIPPED | OUTPATIENT
Start: 2024-04-05

## 2024-05-07 RX ORDER — CLOPIDOGREL BISULFATE 75 MG/1
75 TABLET ORAL DAILY
Qty: 90 TABLET | Refills: 0 | Status: SHIPPED | OUTPATIENT
Start: 2024-05-07

## 2024-05-07 NOTE — TELEPHONE ENCOUNTER
Will Walker called requesting a refill on the following medications:  Requested Prescriptions     Pending Prescriptions Disp Refills    clopidogrel (PLAVIX) 75 MG tablet 90 tablet 0     Sig: Take 1 tablet by mouth daily     Pharmacy verified: [facility] in [place]  .pv    PT IS OUT OF MEDICATION    Date of last visit: 3/31/2023  Date of next visit (if applicable): 6/6/2024

## 2024-07-01 RX ORDER — METOPROLOL SUCCINATE 50 MG/1
50 TABLET, EXTENDED RELEASE ORAL DAILY
Qty: 90 TABLET | Refills: 0 | OUTPATIENT
Start: 2024-07-01

## 2024-07-01 RX ORDER — LISINOPRIL 10 MG/1
10 TABLET ORAL DAILY
Qty: 90 TABLET | Refills: 0 | OUTPATIENT
Start: 2024-07-01

## 2024-07-08 RX ORDER — METOPROLOL SUCCINATE 50 MG/1
50 TABLET, EXTENDED RELEASE ORAL DAILY
Qty: 90 TABLET | Refills: 0 | Status: SHIPPED | OUTPATIENT
Start: 2024-07-08

## 2024-07-08 RX ORDER — LISINOPRIL 10 MG/1
10 TABLET ORAL DAILY
Qty: 90 TABLET | Refills: 0 | Status: SHIPPED | OUTPATIENT
Start: 2024-07-08

## 2024-07-08 RX ORDER — ISOSORBIDE MONONITRATE 30 MG/1
30 TABLET, EXTENDED RELEASE ORAL DAILY
Qty: 90 TABLET | Refills: 0 | Status: SHIPPED | OUTPATIENT
Start: 2024-07-08

## 2024-07-08 NOTE — TELEPHONE ENCOUNTER
Will Walker called requesting a refill on the following medications:  Requested Prescriptions     Pending Prescriptions Disp Refills    lisinopril (PRINIVIL;ZESTRIL) 10 MG tablet 90 tablet 0     Sig: Take 1 tablet by mouth daily    isosorbide mononitrate (IMDUR) 30 MG extended release tablet 90 tablet 3     Sig: Take 1 tablet by mouth daily     [facility]   [facility]      Date of last visit: 3/31/24   Date of next visit (if applicable): 7/18/2024        The Pt states he is out of these 2 medications, could they Please be refilled ASAP.

## 2024-08-01 RX ORDER — CLOPIDOGREL BISULFATE 75 MG/1
75 TABLET ORAL DAILY
Qty: 30 TABLET | Refills: 0 | Status: SHIPPED | OUTPATIENT
Start: 2024-08-01

## 2024-08-06 RX ORDER — CLOPIDOGREL BISULFATE 75 MG/1
75 TABLET ORAL DAILY
Qty: 30 TABLET | Refills: 0 | Status: SHIPPED | OUTPATIENT
Start: 2024-08-06

## 2024-08-06 NOTE — TELEPHONE ENCOUNTER
Will is requesting a refill of their   Requested Prescriptions     Pending Prescriptions Disp Refills    clopidogrel (PLAVIX) 75 MG tablet 30 tablet 0     Sig: Take 1 tablet by mouth daily   . Please advise.      Last Appt:  Visit date not found  Next Appt:   Visit date not found  Preferred pharmacy:   [facility] [address] [place] [address] [phone] -  [phone]

## 2024-09-30 RX ORDER — CLOPIDOGREL BISULFATE 75 MG/1
75 TABLET ORAL DAILY
Qty: 30 TABLET | Refills: 0 | OUTPATIENT
Start: 2024-09-30

## 2024-10-03 RX ORDER — METOPROLOL SUCCINATE 50 MG/1
50 TABLET, EXTENDED RELEASE ORAL DAILY
Qty: 90 TABLET | Refills: 0 | Status: SHIPPED | OUTPATIENT
Start: 2024-10-03

## 2024-10-14 RX ORDER — CLOPIDOGREL BISULFATE 75 MG/1
75 TABLET ORAL DAILY
Qty: 30 TABLET | Refills: 0 | Status: SHIPPED | OUTPATIENT
Start: 2024-10-14

## 2024-10-14 NOTE — TELEPHONE ENCOUNTER
Will Walker called requesting a refill on the following medications:  Requested Prescriptions     Pending Prescriptions Disp Refills    clopidogrel (PLAVIX) 75 MG tablet 30 tablet 0     Sig: Take 1 tablet by mouth daily     Pharmacy verified: [facility] in [place]  .pv      Date of last visit: 3/31/2023  Date of next visit (if applicable): 10/16/2024

## 2024-11-11 RX ORDER — CLOPIDOGREL BISULFATE 75 MG/1
75 TABLET ORAL DAILY
Qty: 30 TABLET | Refills: 0 | Status: SHIPPED | OUTPATIENT
Start: 2024-11-11 | End: 2024-11-12 | Stop reason: SDUPTHER

## 2024-11-12 RX ORDER — CLOPIDOGREL BISULFATE 75 MG/1
75 TABLET ORAL DAILY
Qty: 30 TABLET | Refills: 0 | Status: SHIPPED | OUTPATIENT
Start: 2024-11-12

## 2024-11-12 NOTE — TELEPHONE ENCOUNTER
Will Walker called requesting a refill on the following medications:  Requested Prescriptions     Pending Prescriptions Disp Refills    clopidogrel (PLAVIX) 75 MG tablet 30 tablet 0     Sig: Take 1 tablet by mouth daily     Pharmacy verified:    [address] -  [phone] -  [phone]     Date of last visit: 03/31/2023  Date of next visit (if applicable): 12/4/2024      Pt out of medication, he is concerned that it is an important medication for his stents and he does not want to run out. I advised patient that it may not be able to be refilled without first coming in for an appointment - he stated that he is very busy with work and scheduled for the first week of December so he can give his job ample notice. Please advise if unable to fill Rx.

## 2024-12-04 ENCOUNTER — OFFICE VISIT (OUTPATIENT)
Dept: CARDIOLOGY CLINIC | Age: 59
End: 2024-12-04

## 2024-12-04 VITALS
WEIGHT: 212.8 LBS | HEIGHT: 66 IN | HEART RATE: 101 BPM | BODY MASS INDEX: 34.2 KG/M2 | DIASTOLIC BLOOD PRESSURE: 102 MMHG | SYSTOLIC BLOOD PRESSURE: 165 MMHG

## 2024-12-04 DIAGNOSIS — Z71.6 TOBACCO ABUSE COUNSELING: ICD-10-CM

## 2024-12-04 DIAGNOSIS — I20.9 ANGINA PECTORIS (HCC): ICD-10-CM

## 2024-12-04 DIAGNOSIS — I20.89 ANGINA OF EFFORT (HCC): Primary | ICD-10-CM

## 2024-12-04 DIAGNOSIS — I10 ESSENTIAL HYPERTENSION: ICD-10-CM

## 2024-12-04 DIAGNOSIS — R06.02 SHORTNESS OF BREATH: ICD-10-CM

## 2024-12-04 DIAGNOSIS — I25.10 CORONARY ARTERY DISEASE INVOLVING NATIVE CORONARY ARTERY OF NATIVE HEART WITHOUT ANGINA PECTORIS: ICD-10-CM

## 2024-12-04 DIAGNOSIS — I25.5 ISCHEMIC CARDIOMYOPATHY: ICD-10-CM

## 2024-12-04 PROCEDURE — 3080F DIAST BP >= 90 MM HG: CPT | Performed by: STUDENT IN AN ORGANIZED HEALTH CARE EDUCATION/TRAINING PROGRAM

## 2024-12-04 PROCEDURE — 99214 OFFICE O/P EST MOD 30 MIN: CPT | Performed by: STUDENT IN AN ORGANIZED HEALTH CARE EDUCATION/TRAINING PROGRAM

## 2024-12-04 PROCEDURE — 3077F SYST BP >= 140 MM HG: CPT | Performed by: STUDENT IN AN ORGANIZED HEALTH CARE EDUCATION/TRAINING PROGRAM

## 2024-12-04 NOTE — PATIENT INSTRUCTIONS
You may receive a survey regarding the care you received during your visit. We encourage you to complete and return your survey, as your input is valuable to us. We hope you will choose us in the future for your healthcare needs. Thank you!    Your Medical Assistant today: ALISON Lara  Thank you for coming to our office! It was a pleasure to serve you.

## 2024-12-11 RX ORDER — CLOPIDOGREL BISULFATE 75 MG/1
75 TABLET ORAL DAILY
Qty: 30 TABLET | Refills: 0 | Status: SHIPPED | OUTPATIENT
Start: 2024-12-11

## 2024-12-16 RX ORDER — CLOPIDOGREL BISULFATE 75 MG/1
75 TABLET ORAL DAILY
Qty: 90 TABLET | Refills: 3 | Status: SHIPPED | OUTPATIENT
Start: 2024-12-16

## 2025-01-06 RX ORDER — METOPROLOL SUCCINATE 50 MG/1
50 TABLET, EXTENDED RELEASE ORAL DAILY
Qty: 90 TABLET | Refills: 3 | Status: SHIPPED | OUTPATIENT
Start: 2025-01-06

## 2025-01-07 RX ORDER — LISINOPRIL 10 MG/1
10 TABLET ORAL DAILY
Qty: 90 TABLET | Refills: 3 | Status: SHIPPED | OUTPATIENT
Start: 2025-01-07

## 2025-01-07 NOTE — TELEPHONE ENCOUNTER
Will Walker called requesting a refill on the following medications:  Requested Prescriptions     Pending Prescriptions Disp Refills    lisinopril (PRINIVIL;ZESTRIL) 10 MG tablet [Pharmacy Med Name: Lisinopril 10 MG Oral Tablet] 90 tablet 0     Sig: Take 1 tablet by mouth once daily     Pharmacy verified:  [email or web address]  [facility] [address] [place] [address] [phone] [facility] [phone]     Date of last visit: 12/04/2024  Date of next visit (if applicable): 12/04/2025    **Only has a couple doses left

## 2025-02-24 ENCOUNTER — TELEPHONE (OUTPATIENT)
Dept: CARDIOLOGY CLINIC | Age: 60
End: 2025-02-24

## 2025-02-24 NOTE — TELEPHONE ENCOUNTER
Received voicemail from patient stating he needed to reschedule his testing for today; returned call, left voicemail. Stress lab notified of cancellation.

## 2025-04-07 RX ORDER — ISOSORBIDE MONONITRATE 30 MG/1
30 TABLET, EXTENDED RELEASE ORAL DAILY
Qty: 90 TABLET | Refills: 2 | Status: SHIPPED | OUTPATIENT
Start: 2025-04-07

## 2025-04-21 RX ORDER — ATORVASTATIN CALCIUM 80 MG/1
80 TABLET, FILM COATED ORAL NIGHTLY
Qty: 90 TABLET | Refills: 1 | Status: SHIPPED | OUTPATIENT
Start: 2025-04-21